# Patient Record
Sex: MALE | Race: WHITE | NOT HISPANIC OR LATINO | Employment: OTHER | URBAN - METROPOLITAN AREA
[De-identification: names, ages, dates, MRNs, and addresses within clinical notes are randomized per-mention and may not be internally consistent; named-entity substitution may affect disease eponyms.]

---

## 2017-10-06 ENCOUNTER — APPOINTMENT (EMERGENCY)
Dept: RADIOLOGY | Facility: HOSPITAL | Age: 82
End: 2017-10-06
Payer: MEDICARE

## 2017-10-06 ENCOUNTER — HOSPITAL ENCOUNTER (EMERGENCY)
Facility: HOSPITAL | Age: 82
Discharge: HOME | End: 2017-10-06
Attending: EMERGENCY MEDICINE | Admitting: EMERGENCY MEDICINE
Payer: MEDICARE

## 2017-10-06 VITALS
SYSTOLIC BLOOD PRESSURE: 199 MMHG | OXYGEN SATURATION: 95 % | DIASTOLIC BLOOD PRESSURE: 93 MMHG | HEART RATE: 74 BPM | RESPIRATION RATE: 16 BRPM | WEIGHT: 230 LBS | TEMPERATURE: 97.8 F

## 2017-10-06 DIAGNOSIS — B86 SCABIES: ICD-10-CM

## 2017-10-06 DIAGNOSIS — R05.9 COUGH: Primary | ICD-10-CM

## 2017-10-06 LAB
ALBUMIN SERPL BCP-MCNC: 2.7 G/DL (ref 3.5–5)
ALP SERPL-CCNC: 66 U/L (ref 46–116)
ALT SERPL W P-5'-P-CCNC: 19 U/L (ref 12–78)
ANION GAP SERPL CALCULATED.3IONS-SCNC: 6 MMOL/L (ref 4–13)
AST SERPL W P-5'-P-CCNC: 26 U/L (ref 5–45)
ATRIAL RATE: 64 BPM
BACTERIA UR QL AUTO: NORMAL /HPF
BASOPHILS # BLD AUTO: 0.03 THOUSANDS/ΜL (ref 0–0.1)
BASOPHILS NFR BLD AUTO: 0 % (ref 0–1)
BILIRUB SERPL-MCNC: 0.57 MG/DL (ref 0.2–1)
BILIRUB UR QL STRIP: ABNORMAL
BUN SERPL-MCNC: 15 MG/DL (ref 5–25)
CALCIUM SERPL-MCNC: 8.2 MG/DL (ref 8.3–10.1)
CHLORIDE SERPL-SCNC: 106 MMOL/L (ref 100–108)
CLARITY UR: CLEAR
CO2 SERPL-SCNC: 27 MMOL/L (ref 21–32)
COLOR UR: ABNORMAL
COLOR, POC: NORMAL
CREAT SERPL-MCNC: 1.13 MG/DL (ref 0.6–1.3)
EOSINOPHIL # BLD AUTO: 0.06 THOUSAND/ΜL (ref 0–0.61)
EOSINOPHIL NFR BLD AUTO: 1 % (ref 0–6)
ERYTHROCYTE [DISTWIDTH] IN BLOOD BY AUTOMATED COUNT: 14.5 % (ref 11.6–15.1)
GFR SERPL CREATININE-BSD FRML MDRD: 57 ML/MIN/1.73SQ M
GLUCOSE SERPL-MCNC: 146 MG/DL (ref 65–140)
GLUCOSE UR STRIP-MCNC: NEGATIVE MG/DL
HCT VFR BLD AUTO: 39.2 % (ref 36.5–49.3)
HGB BLD-MCNC: 13.5 G/DL (ref 12–17)
HGB UR QL STRIP.AUTO: ABNORMAL
HYALINE CASTS #/AREA URNS LPF: NORMAL /LPF
KETONES UR STRIP-MCNC: ABNORMAL MG/DL
LEUKOCYTE ESTERASE UR QL STRIP: NEGATIVE
LYMPHOCYTES # BLD AUTO: 0.93 THOUSANDS/ΜL (ref 0.6–4.47)
LYMPHOCYTES NFR BLD AUTO: 10 % (ref 14–44)
MCH RBC QN AUTO: 30.8 PG (ref 26.8–34.3)
MCHC RBC AUTO-ENTMCNC: 34.4 G/DL (ref 31.4–37.4)
MCV RBC AUTO: 90 FL (ref 82–98)
MONOCYTES # BLD AUTO: 1.16 THOUSAND/ΜL (ref 0.17–1.22)
MONOCYTES NFR BLD AUTO: 12 % (ref 4–12)
NEUTROPHILS # BLD AUTO: 7.33 THOUSANDS/ΜL (ref 1.85–7.62)
NEUTS SEG NFR BLD AUTO: 77 % (ref 43–75)
NITRITE UR QL STRIP: NEGATIVE
NON-SQ EPI CELLS URNS QL MICRO: NORMAL /HPF
NRBC BLD AUTO-RTO: 0 /100 WBCS
P AXIS: 23 DEGREES
PH UR STRIP.AUTO: 6 [PH] (ref 4.5–8)
PLATELET # BLD AUTO: 196 THOUSANDS/UL (ref 149–390)
PMV BLD AUTO: 10.5 FL (ref 8.9–12.7)
POTASSIUM SERPL-SCNC: 3.8 MMOL/L (ref 3.5–5.3)
PR INTERVAL: 182 MS
PROT SERPL-MCNC: 6.9 G/DL (ref 6.4–8.2)
PROT UR STRIP-MCNC: ABNORMAL MG/DL
QRS AXIS: -2 DEGREES
QRSD INTERVAL: 92 MS
QT INTERVAL: 408 MS
QTC INTERVAL: 420 MS
RBC # BLD AUTO: 4.38 MILLION/UL (ref 3.88–5.62)
RBC #/AREA URNS AUTO: NORMAL /HPF
SODIUM SERPL-SCNC: 139 MMOL/L (ref 136–145)
SP GR UR STRIP.AUTO: 1.02 (ref 1–1.03)
SPECIMEN SOURCE: NORMAL
T WAVE AXIS: 29 DEGREES
TROPONIN I BLD-MCNC: 0.01 NG/ML (ref 0–0.08)
UROBILINOGEN UR QL STRIP.AUTO: 1 E.U./DL
VENTRICULAR RATE: 64 BPM
WBC # BLD AUTO: 9.54 THOUSAND/UL (ref 4.31–10.16)
WBC #/AREA URNS AUTO: NORMAL /HPF

## 2017-10-06 PROCEDURE — 93005 ELECTROCARDIOGRAM TRACING: CPT | Performed by: EMERGENCY MEDICINE

## 2017-10-06 PROCEDURE — 81002 URINALYSIS NONAUTO W/O SCOPE: CPT | Performed by: EMERGENCY MEDICINE

## 2017-10-06 PROCEDURE — 85025 COMPLETE CBC W/AUTO DIFF WBC: CPT | Performed by: EMERGENCY MEDICINE

## 2017-10-06 PROCEDURE — 87086 URINE CULTURE/COLONY COUNT: CPT | Performed by: EMERGENCY MEDICINE

## 2017-10-06 PROCEDURE — 71020 HB CHEST X-RAY 2VW FRONTAL&LATL: CPT | Performed by: EMERGENCY MEDICINE

## 2017-10-06 PROCEDURE — 36415 COLL VENOUS BLD VENIPUNCTURE: CPT | Performed by: EMERGENCY MEDICINE

## 2017-10-06 PROCEDURE — 80053 COMPREHEN METABOLIC PANEL: CPT | Performed by: EMERGENCY MEDICINE

## 2017-10-06 PROCEDURE — 81001 URINALYSIS AUTO W/SCOPE: CPT

## 2017-10-06 PROCEDURE — 84484 ASSAY OF TROPONIN QUANT: CPT

## 2017-10-06 PROCEDURE — 99285 EMERGENCY DEPT VISIT HI MDM: CPT

## 2017-10-06 RX ORDER — METOPROLOL SUCCINATE 50 MG/1
25 TABLET, EXTENDED RELEASE ORAL DAILY
COMMUNITY
End: 2018-07-02 | Stop reason: SDUPTHER

## 2017-10-06 RX ORDER — CEFPODOXIME PROXETIL 200 MG/1
200 TABLET, FILM COATED ORAL 2 TIMES DAILY
COMMUNITY
End: 2018-06-28

## 2017-10-06 RX ORDER — LEVOTHYROXINE SODIUM 0.1 MG/1
137 TABLET ORAL DAILY
COMMUNITY
End: 2018-06-28 | Stop reason: DRUGHIGH

## 2017-10-06 RX ORDER — GUAIFENESIN 100 MG/5ML
200 SYRUP ORAL 4 TIMES DAILY PRN
COMMUNITY
End: 2018-06-28

## 2017-10-06 RX ORDER — ASPIRIN 81 MG/1
81 TABLET ORAL DAILY
COMMUNITY
End: 2018-07-11

## 2017-10-06 RX ORDER — AZITHROMYCIN 250 MG/1
TABLET, FILM COATED ORAL
Qty: 6 TABLET | Refills: 0 | Status: SHIPPED | OUTPATIENT
Start: 2017-10-06 | End: 2018-06-28

## 2017-10-06 RX ORDER — PERMETHRIN 50 MG/G
CREAM TOPICAL
Qty: 60 G | Refills: 0 | Status: SHIPPED | OUTPATIENT
Start: 2017-10-06 | End: 2018-09-18 | Stop reason: ALTCHOICE

## 2017-10-06 RX ORDER — LORAZEPAM 0.5 MG/1
0.25 TABLET ORAL EVERY 4 HOURS PRN
COMMUNITY

## 2017-10-06 RX ORDER — ACETAMINOPHEN 500 MG
500 TABLET ORAL 3 TIMES DAILY
COMMUNITY
End: 2018-02-21

## 2017-10-06 RX ORDER — TRAZODONE HYDROCHLORIDE 50 MG/1
50 TABLET ORAL DAILY
COMMUNITY
End: 2018-06-28

## 2017-10-06 RX ORDER — QUETIAPINE FUMARATE 25 MG/1
12.5 TABLET, FILM COATED ORAL 2 TIMES DAILY
COMMUNITY
End: 2018-07-10 | Stop reason: SDUPTHER

## 2017-10-06 RX ORDER — MEMANTINE HYDROCHLORIDE 10 MG/1
5 TABLET ORAL 2 TIMES DAILY
COMMUNITY
End: 2018-06-28

## 2017-10-06 NOTE — ED NOTES
[facility] will be arriving for patient at 1530 to transport patient BLS stretcher [facility]  [facility] contacted to let RN know of patient's discharge status        Elane Primrose, RN  10/06/17 7426

## 2017-10-06 NOTE — ED ATTENDING ATTESTATION
Toni Martinez MD, saw and evaluated the patient  I have discussed the patient with the resident/non-physician practitioner and agree with the resident's/non-physician practitioner's findings, Plan of Care, and MDM as documented in the resident's/non-physician practitioner's note, except where noted  All available labs and Radiology studies were reviewed  At this point I agree with the current assessment done in the Emergency Department  I have conducted an independent evaluation of this patient a history and physical is as follows:      Critical Care Time  CritCare Time    79 yo male with dementia with no complaints, sent in from nursing home for decline over the last one week  Pt with no cp, does have cough on exam   Pt with hx of htn, hld, afib  Pt with rash itchy noted generally  Unable to get in touch with nursing home  Vss, afebrile, lungs cta, rrr, abdomen soft nontender, no focal deficits  Pt daughter states pt at baseline  Labs, ekg, cxr, urine

## 2017-10-06 NOTE — ED PROVIDER NOTES
History  Chief Complaint   Patient presents with    Altered Mental Status     per nursing home, slow decline over one week  pt offers no complaints  HPI   This is a 80-year-old male with history of dementia, hypertension, and AFib presenting to the ER for evaluation of altered mental status  Patient is oriented only to himself, unable to provide much history  Patient has no complaints currently  He denies headache, chest pain, shortness of breath, abdominal pain, nausea, extremity pain  Of note, patient is seen in the room coughing  Patient also has multiple erythematous lesions on the back in her extremities, consistent with bedbugs or scabies  The patient states that he does have some achiness around his back and his arms  Patient says he has no pain at this area, states he thinks that the rash has been there for about a month  I attempted to call [facility], there was no answer  Prior to Admission Medications   Prescriptions Last Dose Informant Patient Reported? Taking?    LORazepam (ATIVAN) 0 5 mg tablet   Yes Yes   Sig: Take 0 25 mg by mouth every 4 (four) hours as needed for anxiety   QUEtiapine (SEROquel) 25 mg tablet   Yes Yes   Sig: Take 12 5 mg by mouth 2 (two) times a day   acetaminophen (TYLENOL) 500 mg tablet   Yes Yes   Sig: Take 500 mg by mouth 3 (three) times a day For 5 days, end 10/9   aspirin (ECOTRIN LOW STRENGTH) 81 mg EC tablet   Yes Yes   Sig: Take 81 mg by mouth daily   cefpodoxime (VANTIN) 200 mg tablet   Yes Yes   Sig: Take 200 mg by mouth 2 (two) times a day For 5 days, end 10/11   guaiFENesin (ROBITUSSIN) 100 mg/5 mL syrup   Yes Yes   Sig: Take 200 mg by mouth 4 (four) times a day as needed for cough X 5 days, end 10/9   levothyroxine 100 mcg tablet   Yes Yes   Sig: Take 137 mcg by mouth daily   memantine (NAMENDA) 10 mg tablet   Yes Yes   Sig: Take 10 mg by mouth 2 (two) times a day   metoprolol succinate (TOPROL-XL) 50 mg 24 hr tablet   Yes Yes   Sig: Take 25 mg by mouth daily   traZODone (DESYREL) 50 mg tablet   Yes Yes   Sig: Take 50 mg by mouth daily      Facility-Administered Medications: None       Past Medical History:   Diagnosis Date    Alzheimer disease     Atrial fibrillation (HCC)     Hyperlipidemia     Hypertension        History reviewed  No pertinent surgical history  History reviewed  No pertinent family history  I have reviewed and agree with the history as documented  Social History   Substance Use Topics    Smoking status: Never Smoker    Smokeless tobacco: Never Used    Alcohol use No        Review of Systems   Limited due to dementia  Constitutional: Negative for appetite change, chills and fever  HENT: Negative for congestion, rhinorrhea and sore throat  Eyes: Negative for photophobia, pain and visual disturbance  Respiratory: Negative for cough, chest tightness and shortness of breath  Cardiovascular: Negative for chest pain, palpitations and leg swelling  Gastrointestinal: Negative for abdominal pain, diarrhea, nausea and vomiting  Genitourinary: Negative for dysuria, flank pain and hematuria  Musculoskeletal: Negative for back pain, neck pain and neck stiffness  Skin: Negative for color change, wound  positive for rash  Neurological: Negative for dizziness, numbness and headaches  All other systems reviewed and are negative        Physical Exam  ED Triage Vitals   Temperature Pulse Respirations Blood Pressure SpO2   10/06/17 1110 10/06/17 1110 10/06/17 1110 10/06/17 1110 10/06/17 1110   97 8 °F (36 6 °C) 64 16 139/66 95 %      Temp Source Heart Rate Source Patient Position - Orthostatic VS BP Location FiO2 (%)   10/06/17 1110 10/06/17 1300 10/06/17 1300 10/06/17 1300 --   Oral Monitor Sitting Right arm       Pain Score       10/06/17 1110       No Pain           Physical Exam  BP (!) 199/93   Pulse 74   Temp 97 8 °F (36 6 °C) (Oral)   Resp 16   Wt 104 kg (230 lb)   SpO2 95%     General Appearance:  Alert, cooperative, no distress, appears stated age   Head:  Normocephalic, without obvious abnormality, atraumatic   Eyes:  PERRL, conjunctiva/corneas clear, EOM's intact       Nose: Nares normal, septum midline, mucosa normal, no drainage or sinus tenderness   Throat: Lips, mucosa, and tongue normal; teeth and gums normal   Neck: Supple, symmetrical, trachea midline, no adenopathy   Back:   Symmetric, no curvature, ROM normal, no CVA tenderness   Lungs:   Respirations unlabored, right base with some mild crackles, no wheezing and either fields, clear in the left lung  Chest Wall:  No tenderness or deformity   Heart:  Regular rate and rhythm, S1, S2 normal, no murmur, rub or gallop   Abdomen:   Soft, non-tender, bowel sounds active all four quadrants           Extremities: Extremities normal, atraumatic, no cyanosis or edema   Pulses: 2+ and symmetric   Skin: Patient's upper back bilaterally with erythematous rash, scattered scabs, no vesicles, blanchable  This was also seen on bilateral upper extremities       Neurologic:      Psychiatric:  Moves all extremities, sensation and strength in tact in all extremities    Normal mood and affect         ED Medications  Medications - No data to display    Diagnostic Studies  Labs Reviewed   COMPREHENSIVE METABOLIC PANEL - Abnormal        Result Value Ref Range Status    Glucose 146 (*) 65 - 140 mg/dL Final    Comment:   If the patient is fasting, the ADA then defines impaired fasting glucose as > 100 mg/dL and diabetes as > or equal to 123 mg/dL  Specimen collection should occur prior to Sulfasalazine administration due to the potential for falsely depressed results  Specimen collection should occur prior to Sulfapyridine administration due to the potential for falsely elevated results      Calcium 8 2 (*) 8 3 - 10 1 mg/dL Final    Albumin 2 7 (*) 3 5 - 5 0 g/dL Final    Sodium 139  136 - 145 mmol/L Final    Potassium 3 8  3 5 - 5 3 mmol/L Final    Comment: Slightly Hemolyzed; Results May be Affected    Chloride 106  100 - 108 mmol/L Final    CO2 27  21 - 32 mmol/L Final    Anion Gap 6  4 - 13 mmol/L Final    BUN 15  5 - 25 mg/dL Final    Creatinine 1 13  0 60 - 1 30 mg/dL Final    Comment: Standardized to IDMS reference method    AST 26  5 - 45 U/L Final    Comment: Slightly Hemolyzed; Results May be Affected  Specimen collection should occur prior to Sulfasalazine administration due to the potential for falsely depressed results  ALT 19  12 - 78 U/L Final    Comment:   Specimen collection should occur prior to Sulfasalazine and/or Sulfapyridine administration due to the potential for falsely depressed results  Alkaline Phosphatase 66  46 - 116 U/L Final    Total Protein 6 9  6 4 - 8 2 g/dL Final    Total Bilirubin 0 57  0 20 - 1 00 mg/dL Final    eGFR 57  ml/min/1 73sq m Final    Narrative:     National Kidney Disease Education Program recommendations are as follows:  GFR calculation is accurate only with a steady state creatinine  Chronic Kidney disease less than 60 ml/min/1 73 sq  meters  Kidney failure less than 15 ml/min/1 73 sq  meters     CBC AND DIFFERENTIAL - Abnormal     Neutrophils Relative 77 (*) 43 - 75 % Final    Lymphocytes Relative 10 (*) 14 - 44 % Final    WBC 9 54  4 31 - 10 16 Thousand/uL Final    RBC 4 38  3 88 - 5 62 Million/uL Final    Hemoglobin 13 5  12 0 - 17 0 g/dL Final    Hematocrit 39 2  36 5 - 49 3 % Final    MCV 90  82 - 98 fL Final    MCH 30 8  26 8 - 34 3 pg Final    MCHC 34 4  31 4 - 37 4 g/dL Final    RDW 14 5  11 6 - 15 1 % Final    MPV 10 5  8 9 - 12 7 fL Final    Platelets 100  665 - 390 Thousands/uL Final    nRBC 0  /100 WBCs Final    Monocytes Relative 12  4 - 12 % Final    Eosinophils Relative 1  0 - 6 % Final    Basophils Relative 0  0 - 1 % Final    Neutrophils Absolute 7 33  1 85 - 7 62 Thousands/µL Final    Lymphocytes Absolute 0 93  0 60 - 4 47 Thousands/µL Final    Monocytes Absolute 1 16  0 17 - 1 22 Thousand/µL Final    Eosinophils Absolute 0 06  0 00 - 0 61 Thousand/µL Final    Basophils Absolute 0 03  0 00 - 0 10 Thousands/µL Final   ED URINE MACROSCOPIC - Abnormal     Protein,  (2+) (*) Negative mg/dl Final    Ketones, UA 15 (1+) (*) Negative mg/dl Final    Bilirubin, UA Interference- unable to analyze (*) Negative Final    Comment: The dipstick result may be falsely positive do to interfering substances  We recommend reliance upon serum bilirubin, liver & kidney function tests to guide patient care if clinically indicated  Blood, UA Trace (*) Negative Final    Color, UA Halie   Final    Clarity, UA Clear   Final    pH, UA 6 0  4 5 - 8 0 Final    Leukocytes, UA Negative  Negative Final    Nitrite, UA Negative  Negative Final    Glucose, UA Negative  Negative mg/dl Final    Urobilinogen, UA 1 0  0 2, 1 0 E U /dl E U /dl Final    Specific Gravity, UA 1 025  1 003 - 1 030 Final    Narrative:     CLINITEK RESULT   URINE MICROSCOPIC - Normal    RBC, UA None Seen  None Seen, 0-5 /hpf Final    WBC, UA None Seen  None Seen, 0-5, 5-55, 5-65 /hpf Final    Epithelial Cells None Seen  None Seen, Occasional /hpf Final    Bacteria, UA None Seen  None Seen, Occasional /hpf Final    Hyaline Casts, UA None Seen  None Seen /lpf Final   POCT URINALYSIS DIPSTICK - Normal    Color, UA see chart results   Final   POCT TROPONIN - Normal    POC Troponin I 0 01  0 00 - 0 08 ng/ml Final    Specimen Type VENOUS   Final    Narrative:     [employer] i-Stat handheld analyzer 99% cutoff is > 0 08ng/mL in [facility] Emergency Departments    o cTnI 99% cutoff is useful only when applied to patients in the clinical setting of myocardial ischemia  o cTnI 99% cutoff should be interpreted in the context of clinical history, ECG findings and possibly cardiac imaging to establish correct diagnosis  o cTnI 99% cutoff may be suggestive but clearly not indicative of a coronary event without the clinical setting of myocardial ischemia     URINE CULTURE       X-ray chest 2 views Final Result      Borderline prominence of cardiac silhouette size  Clear lungs  Workstation performed: [id number]             Procedures  ECG 12 Lead Documentation  Date/Time: 10/6/2017 1:21 PM  Performed by: Sri Kerns by: Katie Shetty     Indications / Diagnosis:  Weakness  ECG reviewed by me, the ED Provider: yes    Patient location:  ED  Previous ECG:     Previous ECG:  Compared to current    Similarity:  No change  Interpretation:     Interpretation: normal    Rate:     ECG rate:  64    ECG rate assessment: normal    Rhythm:     Rhythm: sinus rhythm    Ectopy:     Ectopy: none    QRS:     QRS axis:  Normal    QRS intervals:  Normal  Conduction:     Conduction: normal    ST segments:     ST segments:  Normal  T waves:     T waves: normal            Phone Consults  ED Phone Contact    ED Course  ED Course as of Oct 06 1704   Fri Oct 06, 2017   1317 I spoke with nursing supervisor from [facility]  She was unaware of the patient's rash, and did not know much else about his history except that he was having some weakness and some diaphoresis for the past 3 days  She states that there are 2 other residents at that nursing facility with scabies  MDM   After speaking with the supervising nurse, patient may potentially have pneumonia causing his weakness and diaphoresis  We will get a cardiac workup, check a chest x-ray, get a urine analysis  Will also treat patient for scabies  CritCare Time    Disposition  Final diagnoses:   Cough   Scabies     ED Disposition     ED Disposition Condition Comment    Discharge  Guru Chen  discharge to home/self care      Condition at discharge: Stable        Follow-up Information     Follow up With Specialties Details Why Bettie Wu MD Family Medicine Schedule an appointment as soon as possible for a visit in 3 days  [address] [facility]  [phone]          Discharge

## 2017-10-06 NOTE — DISCHARGE INSTRUCTIONS
Acute Cough   WHAT YOU NEED TO KNOW:   What is an acute cough? An acute cough can last up to 3 weeks  Common causes of an acute cough include a cold, allergies, or a lung infection  How is the cause of an acute cough diagnosed? Your healthcare provider will examine you and listen to your lungs  Tell your healthcare provider if you cough up any mucus, or have a fever or shortness of breath  Also tell your provider what makes the cough better or worse  Depending on your symptoms, you may need a chest x-ray  A sample of mucus may be collected and tested for infection  How is an acute cough treated? An acute cough usually goes away on its own  Ask your healthcare provider about medicines you can take to decrease your cough  You may need medicine to stop the cough, decrease swelling in your airways, or help open your airways  Medicine may also be given to help you cough up mucus  If you have an infection caused by bacteria, you may need antibiotics  What can I do to manage my cough? · Do not smoke and stay away from others who smoke  Nicotine and other chemicals in cigarettes and cigars can cause lung damage and make your cough worse  Ask your healthcare provider for information if you currently smoke and need help to quit  E-cigarettes or smokeless tobacco still contain nicotine  Talk to your healthcare provider before you use these products  · Drink extra liquids as directed  Liquids will help thin and loosen mucus so you can cough it up  Liquids will also help prevent dehydration  Examples of good liquids to drink include water, fruit juice, and broth  Do not drink liquids that contain caffeine  Caffeine can increase your risk for dehydration  Ask your healthcare provider how much liquid to drink each day  · Rest as directed  Do not do activities that make your cough worse, such as exercise  · Use a humidifier or vaporizer    Use a cool mist humidifier or a vaporizer to increase air moisture in your home  This may make it easier for you to breathe and help decrease your cough  · Eat 2 to 5 mL of honey 2 times each day  Honey can help thin mucus and decrease your cough  · Use cough drops or lozenges  These can help decrease throat irritation and your cough  When should I seek immediate care? · You have trouble breathing or feel short of breath  · You cough up blood, or you see blood in your mucus  · You faint or feel weak or dizzy  · You have chest pain when you cough or take a deep breath  · You have new wheezing  When should I contact my healthcare provider? · You have a fever  · Your cough lasts longer than 4 weeks  · Your symptoms do not improve with treatment  · You have questions or concerns about your condition or care  CARE AGREEMENT:   You have the right to help plan your care  Learn about your health condition and how it may be treated  Discuss treatment options with your caregivers to decide what care you want to receive  You always have the right to refuse treatment  The above information is an  only  It is not intended as medical advice for individual conditions or treatments  Talk to your doctor, nurse or pharmacist before following any medical regimen to see if it is safe and effective for you  © 2017 [employer]  Information is for End User's use only and may not be sold, redistributed or otherwise used for commercial purposes  All illustrations and images included in CareNotes® are the copyrighted property of [employer]  or [employer]  Scabies   WHAT YOU NEED TO KNOW:   What is scabies? Scabies is a skin condition that is caused by scabies mites  Scabies mites are tiny bugs that burrow, lay eggs, and live underneath the skin  Scabies is spread through close contact with a person who has scabies  This includes having sex, sleeping in the same bed, or sharing towels or clothing  Scabies can spread quickly and must be treated as soon as it is found  What are the signs and symptoms of scabies? You may not know you have scabies until a few weeks after mites are under your skin  Scabies mites are too small to be seen on your body  You may have any of the following:  · Red, raised bumps on your skin    · Bad itching that is usually worse at night    · Burrow marks (short wavy lines) between your fingers, or on your ankles, elbows, groin, armpits, or breasts  How is scabies diagnosed and treated? Your healthcare provider will examine your skin  The provider will put mineral oil on your skin and scrape it across with a small blade  The skin scraping will be checked under a microscope for eggs, mites, or their droppings  Your healthcare provider may want to treat scabies even if signs of mites are not found  Several kinds of medicine may be used to treat scabies  The medicine may be a cream or pill  Always follow the directions for the scabies medicine you are told to use  · Your healthcare provider may tell you to rub a thin layer of the medicine onto your entire body from the neck down  · Leave the cream on for the amount of time that is required for the medicine you are using  This may be between 8 to 14 hours  · Take a bath or shower to wash all medicine from your skin after the scabies treatment is done  · Put on clean clothes after you have rinsed the medicine off  You may need another scabies treatment in about 7 to 10 days if you continue to have symptoms  What can I do about the itching? Your skin may continue to itch for 2 or 3 weeks, even after the scabies mites are gone  Over-the-counter antihistamines or cortisone cream may help relieve itching  Trim your fingernails so you do not spread any mites that are still alive after treatment  Do not scratch your skin  Scratches may cause a skin infection  A cool bath may also help relieve the itching    How do I prevent the spread of scabies? · Have all family members use scabies medicine  Tell all sex partners and anyone who has shared your clothing or bed for the past month about the scabies  Tell them to ask their healthcare provider for scabies medicine even if they have no itching, rash, or burrow marks  · Wash all items that you have used since 3 days before you learned about your scabies  Use hot water to wash all clothing, bedding, and towels  Dry them for at least 20 minutes on the hot cycle of a dryer  Take items to be dry cleaned that cannot be washed in a washing machine  Place any clothing or bedding that cannot be washed or dry cleaned in a closed plastic bag for 1 week  · Do not have close body contact with anyone until the scabies mites are gone  Talk to your healthcare provider about how long you need to wait  Also ask about public places to avoid, such as the gym  When should I seek immediate care? · You develop a fever and red, swollen, painful areas on your skin  When should I contact my healthcare provider? · The bites become crusty or filled with pus  · You have worsening itching after scabies treatment  · You have new bite or burrow marks after treatment  · You have questions or concerns about your condition or care  CARE AGREEMENT:   You have the right to help plan your care  Learn about your health condition and how it may be treated  Discuss treatment options with your caregivers to decide what care you want to receive  You always have the right to refuse treatment  The above information is an  only  It is not intended as medical advice for individual conditions or treatments  Talk to your doctor, nurse or pharmacist before following any medical regimen to see if it is safe and effective for you  © 2017 [employer] Information is for End User's use only and may not be sold, redistributed or otherwise used for commercial purposes   All illustrations and images included in CareNotes® are the copyrighted property of A D A [employer] , Inc  or [facility] 17

## 2017-10-07 LAB — BACTERIA UR CULT: NORMAL

## 2018-02-13 ENCOUNTER — HOSPITAL ENCOUNTER (EMERGENCY)
Facility: HOSPITAL | Age: 83
Discharge: NURSING, REHAB OR LONG-TERM CARE | End: 2018-02-14
Attending: EMERGENCY MEDICINE
Payer: MEDICARE

## 2018-02-13 DIAGNOSIS — R05.9 COUGH: Primary | ICD-10-CM

## 2018-02-13 PROCEDURE — 93005 ELECTROCARDIOGRAM TRACING: CPT

## 2018-02-14 VITALS
DIASTOLIC BLOOD PRESSURE: 68 MMHG | SYSTOLIC BLOOD PRESSURE: 151 MMHG | TEMPERATURE: 97.6 F | WEIGHT: 190 LBS | RESPIRATION RATE: 24 BRPM | BODY MASS INDEX: 26.6 KG/M2 | HEART RATE: 60 BPM | OXYGEN SATURATION: 99 % | HEIGHT: 71 IN

## 2018-02-14 LAB
ATRIAL RATE: 69 BPM
P AXIS: 37 DEGREES
PR INTERVAL: 184 MS
QRS AXIS: 8 DEGREES
QRSD INTERVAL: 88 MS
QT INTERVAL: 392 MS
QTC INTERVAL: 420 MS
T WAVE AXIS: 17 DEGREES
VENTRICULAR RATE: 69 BPM

## 2018-02-14 PROCEDURE — 93010 ELECTROCARDIOGRAM REPORT: CPT | Performed by: INTERNAL MEDICINE

## 2018-02-14 PROCEDURE — 99285 EMERGENCY DEPT VISIT HI MDM: CPT

## 2018-02-14 NOTE — ED PROVIDER NOTES
History  Chief Complaint   Patient presents with    Shortness of Breath     Pt reports SOB  Pt was sent in by [facility] Dr Carlos Cardenas for pneumonia and atelectasis dx earlier today  Pt came in with a POLST that danay polo pt's ED chart  80year-old man with a history of dementia, afib, hypertension, and hyperlipidemia presents for evaluation of a cough  Patient is a resident at [facility] who was evaluated for a cough today with chest xray which showed mild subsegmental atelectasis with possible bronchopneumonia RLL with worsening opacity LLL  He did not have increased work of breathing or hypoxia  He was sent in for evaluation of this chest xray finding  Patient has a POLST form which states he should not receive antibiotics or IV fluids  He is unable to provide history or make his own decisions secondary to underlying dementia  On arrival, patient is afebrile and hypertensive to 173/84 with otherwise unremarkable vital signs  Physical exam shows decreased aeration in bilateral bases and is otherwise unremarkable, as below  No indication for admission, further workup, or treatment at this time  Will discharge patient back to [facility]  Prior to Admission Medications   Prescriptions Last Dose Informant Patient Reported? Taking?    LORazepam (ATIVAN) 0 5 mg tablet   Yes No   Sig: Take 0 25 mg by mouth every 4 (four) hours as needed for anxiety   QUEtiapine (SEROquel) 25 mg tablet   Yes No   Sig: Take 12 5 mg by mouth 2 (two) times a day   acetaminophen (TYLENOL) 500 mg tablet   Yes No   Sig: Take 500 mg by mouth 3 (three) times a day For 5 days, end 10/9   aspirin (ECOTRIN LOW STRENGTH) 81 mg EC tablet   Yes No   Sig: Take 81 mg by mouth daily   azithromycin (ZITHROMAX) 250 mg tablet   No No   Sig: Take 2 tablets today then 1 tablet daily x 4 days   cefpodoxime (VANTIN) 200 mg tablet   Yes No   Sig: Take 200 mg by mouth 2 (two) times a day For 5 days, end 10/11 guaiFENesin (ROBITUSSIN) 100 mg/5 mL syrup   Yes No   Sig: Take 200 mg by mouth 4 (four) times a day as needed for cough X 5 days, end 10/9   levothyroxine 100 mcg tablet   Yes No   Sig: Take 137 mcg by mouth daily   memantine (NAMENDA) 10 mg tablet   Yes No   Sig: Take 10 mg by mouth 2 (two) times a day   metoprolol succinate (TOPROL-XL) 50 mg 24 hr tablet   Yes No   Sig: Take 25 mg by mouth daily   permethrin (ELIMITE) 5 % cream   No No   Sig: Apply to affected area once   traZODone (DESYREL) 50 mg tablet   Yes No   Sig: Take 50 mg by mouth daily      Facility-Administered Medications: None       Past Medical History:   Diagnosis Date    Alzheimer disease     Atrial fibrillation ([facility] [address] )     Hyperlipidemia     Hypertension        History reviewed  No pertinent surgical history  History reviewed  No pertinent family history  I have reviewed and agree with the history as documented  Social History   Substance Use Topics    Smoking status: Never Smoker    Smokeless tobacco: Never Used    Alcohol use No        Review of Systems   Unable to perform ROS: Dementia       Physical Exam  ED Triage Vitals   Temperature Pulse Respirations Blood Pressure SpO2   02/13/18 2241 02/13/18 2241 02/13/18 2241 02/13/18 2243 02/13/18 2241   97 6 °F (36 4 °C) 68 22 (!) 173/84 97 %      Temp Source Heart Rate Source Patient Position - Orthostatic VS BP Location FiO2 (%)   02/13/18 2241 -- -- -- --   Oral          Pain Score       02/13/18 2240       No Pain           Orthostatic Vital Signs  Vitals:    02/13/18 2315 02/14/18 0045 02/14/18 0115 02/14/18 0315   BP: 169/73 149/64 (!) 175/70 151/68   Pulse: 60 58 60 60       Physical Exam   Constitutional: He appears well-developed and well-nourished  No distress  HENT:   Head: Normocephalic and atraumatic  Eyes: Conjunctivae are normal  Pupils are equal, round, and reactive to light  No scleral icterus  Neck: Neck supple  No JVD present     Cardiovascular: Normal rate, regular rhythm and normal heart sounds  Exam reveals no gallop and no friction rub  No murmur heard  Pulmonary/Chest: Effort normal  No respiratory distress  He has no wheezes  He has no rales  Decreased aeration bilateral bases, dry cough    Abdominal: Soft  He exhibits no distension  There is no tenderness  There is no rebound and no guarding  Musculoskeletal: He exhibits no edema or tenderness  Neurological: He is alert  Oriented to person only   Skin: Skin is warm and dry  He is not diaphoretic  Psychiatric: He has a normal mood and affect  His behavior is normal  Thought content normal    Vitals reviewed  ED Medications  Medications - No data to display    Diagnostic Studies  Results Reviewed     None                 No orders to display         Procedures  Procedures      Phone Consults  ED Phone Contact    ED Course  ED Course            Identification of Seniors at [facility] Most Recent Value   (ISAR) Identification of Seniors at Risk   Before the illness or injury that brought you to the Emergency, did you need someone to help you on a regular basis? 0 Filed at: 02/13/2018 2243   In the last 24 hours, have you needed more help than usual?  0 Filed at: 02/13/2018 2243   Have you been hospitalized for one or more nights during the past 6 months? 0 Filed at: 02/13/2018 2243   In general, do you see well?  0 Filed at: 02/13/2018 2243   In general, do you have serious problems with your memory? 1 Filed at: 02/13/2018 2243   Do you take more than three different medications every day? 1 Filed at: 02/13/2018 2243   ISAR Score  2 Filed at: 02/13/2018 2243                          MDM  Number of Diagnoses or Management Options  Cough:   Diagnosis management comments: No respiratory distress or hypoxia  Patient does not want antibiotics or IV fluids per POLST  He was discharged back to [facility]      CritCare Time    Disposition  Final diagnoses:   Cough     Time reflects when diagnosis was documented in both MDM as applicable and the Disposition within this note     Time User Action Codes Description Comment    2/13/2018 11:44 PM Kristen Fung Add [R05] Cough       ED Disposition     ED Disposition Condition Comment    Discharge  Darrick Garcia  discharge to home/self care      Condition at discharge: Good        Follow-up Information     Follow up With Specialties Details Why [address], [place] Family Medicine Schedule an appointment as soon as possible for a visit  Kavita Garcia  [phone]          Discharge Medication List as of 2/13/2018 11:45 PM      CONTINUE these medications which have NOT CHANGED    Details   acetaminophen (TYLENOL) 500 mg tablet Take 500 mg by mouth 3 (three) times a day For 5 days, end 10/9, Historical Med      aspirin (ECOTRIN LOW STRENGTH) 81 mg EC tablet Take 81 mg by mouth daily, Historical Med      azithromycin (ZITHROMAX) 250 mg tablet Take 2 tablets today then 1 tablet daily x 4 days, Print      cefpodoxime (VANTIN) 200 mg tablet Take 200 mg by mouth 2 (two) times a day For 5 days, end 10/11, Historical Med      guaiFENesin (ROBITUSSIN) 100 mg/5 mL syrup Take 200 mg by mouth 4 (four) times a day as needed for cough X 5 days, end 10/9, Historical Med      levothyroxine 100 mcg tablet Take 137 mcg by mouth daily, Historical Med      LORazepam (ATIVAN) 0 5 mg tablet Take 0 25 mg by mouth every 4 (four) hours as needed for anxiety, Historical Med      memantine (NAMENDA) 10 mg tablet Take 10 mg by mouth 2 (two) times a day, Historical Med      metoprolol succinate (TOPROL-XL) 50 mg 24 hr tablet Take 25 mg by mouth daily, Historical Med      permethrin (ELIMITE) 5 % cream Apply to affected area once, Print      QUEtiapine (SEROquel) 25 mg tablet Take 12 5 mg by mouth 2 (two) times a day, Historical Med      traZODone (DESYREL) 50 mg tablet Take 50 mg by mouth daily, Historical Med No discharge procedures on file  ED Provider  Attending physically available and evaluated Amos Melvin I managed the patient along with the ED Attending      Electronically Signed by         Cece Coleman MD  02/14/18 9972

## 2018-02-14 NOTE — ED ATTENDING ATTESTATION
Yaz Saunders DO, saw and evaluated the patient  I have discussed the patient with the resident/non-physician practitioner and agree with the resident's/non-physician practitioner's findings, Plan of Care, and MDM as documented in the resident's/non-physician practitioner's note, except where noted  All available labs and Radiology studies were reviewed  At this point I agree with the current assessment done in the Emergency Department  I have conducted an independent evaluation of this patient a history and physical is as follows:    80-year-old male presents for evaluation of possible pneumonia noted on x-ray done at nursing home facility today  Patient offers no complaints at this time denies cough, shortness of breath, fevers, chest pain  Patient presents with a POLST which indicates that he would not want antibiotics or IV fluids  There is nothing that we have to offer the patient this time we will discharge him back to the facility          Critical Care Time  CritCare Time    Procedures

## 2018-02-14 NOTE — DISCHARGE INSTRUCTIONS
Your POLST states that you would not want antibiotics or IV fluids  You had a normal work of breathing and oxygen saturation  There is nothing for us to offer you here in the ER  Follow up with your primary doctor and return to the ER for new or worrisome symptoms  Acute Cough   WHAT YOU NEED TO KNOW:   An acute cough can last up to 3 weeks  Common causes of an acute cough include a cold, allergies, or a lung infection  DISCHARGE INSTRUCTIONS:   Return to the emergency department if:   · You have trouble breathing or feel short of breath  · You cough up blood, or you see blood in your mucus  · You faint or feel weak or dizzy  · You have chest pain when you cough or take a deep breath  · You have new wheezing  Contact your healthcare provider if:   · You have a fever  · Your cough lasts longer than 4 weeks  · Your symptoms do not improve with treatment  · You have questions or concerns about your condition or care  Medicines:   · Medicines  may be needed to stop the cough, decrease swelling in your airways, or help open your airways  Medicine may also be given to help you cough up mucus  Ask your healthcare provider what over-the-counter medicines you can take  If you have an infection caused by bacteria, you may need antibiotics  · Take your medicine as directed  Contact your healthcare provider if you think your medicine is not helping or if you have side effects  Tell him or her if you are allergic to any medicine  Keep a list of the medicines, vitamins, and herbs you take  Include the amounts, and when and why you take them  Bring the list or the pill bottles to follow-up visits  Carry your medicine list with you in case of an emergency  Manage your symptoms:   · Do not smoke and stay away from others who smoke  Nicotine and other chemicals in cigarettes and cigars can cause lung damage and make your cough worse   Ask your healthcare provider for information if you currently smoke and need help to quit  E-cigarettes or smokeless tobacco still contain nicotine  Talk to your healthcare provider before you use these products  · Drink extra liquids as directed  Liquids will help thin and loosen mucus so you can cough it up  Liquids will also help prevent dehydration  Examples of good liquids to drink include water, fruit juice, and broth  Do not drink liquids that contain caffeine  Caffeine can increase your risk for dehydration  Ask your healthcare provider how much liquid to drink each day  · Rest as directed  Do not do activities that make your cough worse, such as exercise  · Use a humidifier or vaporizer  Use a cool mist humidifier or a vaporizer to increase air moisture in your home  This may make it easier for you to breathe and help decrease your cough  · Eat 2 to 5 mL of honey 2 times each day  Honey can help thin mucus and decrease your cough  · Use cough drops or lozenges  These can help decrease throat irritation and your cough  Follow up with your healthcare provider as directed:  Write down your questions so you remember to ask them during your visits  © 2017 [facility] Information is for End User's use only and may not be sold, redistributed or otherwise used for commercial purposes  All illustrations and images included in CareNotes® are the copyrighted property of [employer]  or [address]  The above information is an  only  It is not intended as medical advice for individual conditions or treatments  Talk to your doctor, nurse or pharmacist before following any medical regimen to see if it is safe and effective for you

## 2018-02-21 ENCOUNTER — APPOINTMENT (EMERGENCY)
Dept: RADIOLOGY | Facility: HOSPITAL | Age: 83
End: 2018-02-21
Payer: MEDICARE

## 2018-02-21 ENCOUNTER — HOSPITAL ENCOUNTER (EMERGENCY)
Facility: HOSPITAL | Age: 83
Discharge: HOME | End: 2018-02-21
Attending: EMERGENCY MEDICINE | Admitting: EMERGENCY MEDICINE
Payer: MEDICARE

## 2018-02-21 VITALS
TEMPERATURE: 98.2 F | OXYGEN SATURATION: 94 % | RESPIRATION RATE: 19 BRPM | BODY MASS INDEX: 25.8 KG/M2 | WEIGHT: 185 LBS | HEART RATE: 54 BPM | DIASTOLIC BLOOD PRESSURE: 80 MMHG | SYSTOLIC BLOOD PRESSURE: 169 MMHG

## 2018-02-21 DIAGNOSIS — T14.8XXA ABRASION: ICD-10-CM

## 2018-02-21 DIAGNOSIS — S09.90XA CLOSED HEAD INJURY, INITIAL ENCOUNTER: Primary | ICD-10-CM

## 2018-02-21 LAB
ATRIAL RATE: 57 BPM
P AXIS: 27 DEGREES
PR INTERVAL: 188 MS
QRS AXIS: 11 DEGREES
QRSD INTERVAL: 100 MS
QT INTERVAL: 428 MS
QTC INTERVAL: 416 MS
T WAVE AXIS: 61 DEGREES
VENTRICULAR RATE: 57 BPM

## 2018-02-21 PROCEDURE — 70450 CT HEAD/BRAIN W/O DYE: CPT

## 2018-02-21 PROCEDURE — 72125 CT NECK SPINE W/O DYE: CPT

## 2018-02-21 PROCEDURE — 93005 ELECTROCARDIOGRAM TRACING: CPT

## 2018-02-21 PROCEDURE — 93010 ELECTROCARDIOGRAM REPORT: CPT | Performed by: INTERNAL MEDICINE

## 2018-02-21 PROCEDURE — 99284 EMERGENCY DEPT VISIT MOD MDM: CPT

## 2018-02-21 RX ORDER — TRIAMCINOLONE ACETONIDE 1 MG/G
CREAM TOPICAL
COMMUNITY
End: 2018-08-24 | Stop reason: SDUPTHER

## 2018-02-21 RX ORDER — HYDROXYZINE HYDROCHLORIDE 10 MG/1
25 TABLET, FILM COATED ORAL EVERY 8 HOURS PRN
COMMUNITY
End: 2018-09-19 | Stop reason: SDUPTHER

## 2018-02-21 RX ORDER — LANOLIN ALCOHOL/MO/W.PET/CERES
CREAM (GRAM) TOPICAL DAILY
COMMUNITY

## 2018-02-21 NOTE — ED NOTES
Patient changed and repositioned at this time  Resting comfortably, awaiting transport back to [facility]        Jomar Marquez RN  02/21/18 [id number]

## 2018-02-21 NOTE — ED NOTES
[facility] receiving RN aware of patients plan of care and updated on status to be returning back to facility at this time       Roopa Velasquez RN  02/21/18 5119

## 2018-02-21 NOTE — ED PROVIDER NOTES
History  Chief Complaint   Patient presents with    Fall     Patient comes from [facility] after an staff found him laying next to his bed following an unwittnessed fall  Patient with dementia hx, currently at baseline per staff  Patient takes 81 mg ASA, currently w/o complaints at this time  81 yo M resident of [facility] dementia unit presents to ED after unwitnessed fall  Pt was found lying on the ground next to his bed  Pt says he got "hit in the head somehow and then went unconscious and fell while I was unconscious " He does not remember falling  Pt complains of forehead pain and neck pain  Denies chest pain, shortness of breath, or abdominal pain  Per chart review pt on ASA, no anticoagulation  Prior to Admission Medications   Prescriptions Last Dose Informant Patient Reported? Taking?    LORazepam (ATIVAN) 0 5 mg tablet Unknown at Unknown time  Yes No   Sig: Take 0 25 mg by mouth every 4 (four) hours as needed for anxiety   QUEtiapine (SEROquel) 25 mg tablet 2/20/2018 at Unknown time  Yes Yes   Sig: Take 12 5 mg by mouth 2 (two) times a day   aspirin (ECOTRIN LOW STRENGTH) 81 mg EC tablet Unknown at Unknown time  Yes No   Sig: Take 81 mg by mouth daily   azithromycin (ZITHROMAX) 250 mg tablet 2/20/2018 at Unknown time  No Yes   Sig: Take 2 tablets today then 1 tablet daily x 4 days   cefpodoxime (VANTIN) 200 mg tablet 2/20/2018 at Unknown time  Yes Yes   Sig: Take 200 mg by mouth 2 (two) times a day For 5 days, end 10/11   guaiFENesin (ROBITUSSIN) 100 mg/5 mL syrup 2/20/2018 at Unknown time  Yes Yes   Sig: Take 200 mg by mouth 4 (four) times a day as needed for cough X 5 days, end 10/9   hydrOXYzine HCL (ATARAX) 10 mg tablet   Yes Yes   Sig: Take 25 mg by mouth every 8 (eight) hours as needed for itching   levothyroxine 100 mcg tablet 2/20/2018 at Unknown time  Yes Yes   Sig: Take 137 mcg by mouth daily   memantine (NAMENDA) 10 mg tablet 2/20/2018 at Unknown time  Yes Yes Sig: Take 5 mg by mouth 2 (two) times a day     metoprolol succinate (TOPROL-XL) 50 mg 24 hr tablet 2/20/2018 at Unknown time  Yes Yes   Sig: Take 25 mg by mouth daily   permethrin (ELIMITE) 5 % cream 2/20/2018 at Unknown time  No Yes   Sig: Apply to affected area once   traZODone (DESYREL) 50 mg tablet 2/20/2018 at Unknown time  Yes Yes   Sig: Take 50 mg by mouth daily   triamcinolone (KENALOG) 0 1 % cream   Yes Yes   Sig: Apply topically daily at bedtime   white petrolatum-corn starch-lanolin (TRIPLE PASTE) 12 8 % ointment   Yes Yes   Sig: Apply topically as needed for irritation   white petrolatum-mineral oil (EUCERIN,HYDROCERIN) cream   Yes Yes   Sig: Apply topically daily      Facility-Administered Medications: None       Past Medical History:   Diagnosis Date    Alzheimer disease     Atrial fibrillation (HCC)     Edema extremities     Hyperlipidemia     Hypertension        History reviewed  No pertinent surgical history  History reviewed  No pertinent family history  I have reviewed and agree with the history as documented  Social History   Substance Use Topics    Smoking status: Never Smoker    Smokeless tobacco: Never Used    Alcohol use No        Review of Systems   Unable to perform ROS: Dementia       Physical Exam  ED Triage Vitals [02/21/18 0141]   Temperature Pulse Respirations Blood Pressure SpO2   98 2 °F (36 8 °C) 55 20 (!) 203/85 94 %      Temp Source Heart Rate Source Patient Position - Orthostatic VS BP Location FiO2 (%)   Oral Monitor Lying Right arm --      Pain Score       No Pain           Orthostatic Vital Signs  Vitals:    02/21/18 0141 02/21/18 0149 02/21/18 0215 02/21/18 0245   BP: (!) 203/85 (!) 183/73 (!) 186/80 (!) 180/81   Pulse: 55  60    Patient Position - Orthostatic VS: Lying  Lying Lying       Physical Exam   Constitutional: No distress  Elderly appearing male   HENT:   Head: Head is with abrasion (forehead skin tear)   Head is without raccoon's eyes and without Ortega's sign  Right Ear: No hemotympanum  Left Ear: No hemotympanum  Mouth/Throat: Oropharynx is clear and moist    Eyes: Conjunctivae and EOM are normal  Right eye exhibits no discharge  Left eye exhibits no discharge  Neck:   No midline neck tenderness to palpation  C-collar placed   Cardiovascular: Normal rate, regular rhythm and intact distal pulses  Pulmonary/Chest: Effort normal  No respiratory distress  He exhibits no tenderness  Abdominal: Soft  There is no tenderness  There is no rebound and no guarding  Musculoskeletal: Normal range of motion  He exhibits tenderness (R thumb)  He exhibits no edema or deformity  Neurological: He is alert  No sensory deficit  Skin: Skin is warm and dry  Skin tear forehead  Bilateral knee abrasions  Small subungual hematoma R thumb   Nursing note and vitals reviewed  ED Medications  Medications - No data to display    Diagnostic Studies  Results Reviewed     None                 CT head without contrast    (Results Pending)   CT spine cervical without contrast    (Results Pending)         Procedures  ECG 12 Lead Documentation  Date/Time: 2/21/2018 2:11 AM  Performed by: Melissa Evans  Authorized by: Karin Michael     Indications / Diagnosis:  Unwitnessed fall  ECG reviewed by me, the ED Provider: yes    Patient location:  ED  Previous ECG:     Previous ECG:  Compared to current  Rate:     ECG rate:  57    ECG rate assessment: bradycardic    Rhythm:     Rhythm: sinus rhythm    ST segments:     ST segments:  Normal  T waves:     T waves: normal            Phone Consults  ED Phone Contact    ED Course  ED Course            Identification of Seniors at Risk    Flowsheet Row Most Recent Value   (ISAR) Identification of Seniors at Risk   Before the illness or injury that brought you to the Emergency, did you need someone to help you on a regular basis?   1 Filed at: 02/21/2018 0142   In the last 24 hours, have you needed more help than usual?  0 Filed at: 02/21/2018 0142   Have you been hospitalized for one or more nights during the past 6 months? 1 Filed at: 02/21/2018 0142   In general, do you see well?  0 Filed at: 02/21/2018 0142   In general, do you have serious problems with your memory? 1 Filed at: 02/21/2018 0142   Do you take more than three different medications every day? 1 Filed at: 02/21/2018 0142   ISAR Score  4 Filed at: 02/21/2018 0142                          MDM  Number of Diagnoses or Management Options  Diagnosis management comments: CT head and neck negative for acute process  EKG without evidence of acute ischemia  Will discharge pt back to nursing facility  CritCare Time    Disposition  Final diagnoses:   Closed head injury, initial encounter   Abrasion     Time reflects when diagnosis was documented in both MDM as applicable and the Disposition within this note     Time User Action Codes Description Comment    2/21/2018  3:20 AM Roni Norris Add [S09 [id number]] Closed head injury, initial encounter     2/21/2018  3:22 AM Neto Mayer, [facility]  [address]] Abrasion       ED Disposition     ED Disposition Condition Comment    Discharge  Amparo Khoury  discharge to home/self care  Condition at discharge: Stable        Follow-up Information     Follow up With Specialties Details Why Contact Info Additional [address] Emergency Department Emergency Medicine  If symptoms worsen, As needed [facility] ED, [address], [address]        Patient's Medications   Discharge Prescriptions    No medications on file     No discharge procedures on file  ED Provider  Attending physically available and evaluated Amparo Khoury I managed the patient along with the ED Attending      Electronically Signed by         Lucila Wiggins MD  02/21/18 4824

## 2018-02-21 NOTE — ED NOTES
Dr Marysol Mason at the bedside for patient evaluation at this time     George Santos, RN  02/21/18 0228

## 2018-02-21 NOTE — ED NOTES
[facility] arrived for patient transport   Report given to transport team       April Hope RN  02/21/18 1193

## 2018-02-21 NOTE — DISCHARGE INSTRUCTIONS

## 2018-02-21 NOTE — EMTALA/ACUTE CARE TRANSFER
[address]   [place] [address]  Dept: [address] TRANSFER CONSENT    NAME Mane Carpenter   1926                              MRN [id number]    I have been informed of my rights regarding examination, treatment, and transfer   by Dr Arden Leal MD    Benefits: Continuity of care    Risks: Increased discomfort during transfer      Consent for Transfer:  I acknowledge that my medical condition has been evaluated and explained to me by the emergency department physician or other qualified medical person and/or my attending physician, who has recommended that I be transferred to the service of    at [address] [facility] Name, [address] : [facility]  The above potential benefits of such transfer, the potential risks associated with such transfer, and the probable risks of not being transferred have been explained to me, and I fully understand them  The doctor has explained that, in my case, the benefits of transfer outweigh the risks  I agree to be transferred  I authorize the performance of emergency medical procedures and treatments upon me in both transit and upon arrival at the receiving facility  Additionally, I authorize the release of any and all medical records to the receiving facility and request they be transported with me, if possible  I understand that the safest mode of transportation during a medical emergency is an ambulance and that the Hospital advocates the use of this mode of transport  Risks of traveling to the receiving facility by car, including absence of medical control, life sustaining equipment, such as oxygen, and medical personnel has been explained to me and I fully understand them  (DEYSI CORRECT BOX BELOW)  [  ]  I consent to the stated transfer and to be transported by ambulance/helicopter    [  ]  I consent to the stated transfer, but refuse transportation by ambulance and accept full responsibility for my transportation by car  I understand the risks of non-ambulance transfers and I exonerate the Hospital and its staff from any deterioration in my condition that results from this refusal     X___________________________________________    DATE  18  TIME________  Signature of patient or legally responsible individual signing on patient behalf           RELATIONSHIP TO PATIENT_________________________          Provider Certification    NAME Rashmi Conteh   1926                              MRN [id number]    A medical screening exam was performed on the above named patient  Based on the examination:    Condition Necessitating Transfer The primary encounter diagnosis was Closed head injury, initial encounter  A diagnosis of Abrasion was also pertinent to this visit  Patient Condition: The patient has been stabilized such that within reasonable medical probability, no material deterioration of the patient condition or the condition of the unborn child(fozia) is likely to result from the transfer    Reason for Transfer: Other (Include comment)____________________ (returning to residence)    Transfer Requirements: [address]   · Space available and qualified personnel available for treatment as acknowledged by    · Agreed to accept transfer and to provide appropriate medical treatment as acknowledged by          · Appropriate medical records of the examination and treatment of the patient are provided at the time of transfer   [address] [facility], [address] _______  · Transfer will be performed by qualified personnel from    and appropriate transfer equipment as required, including the use of necessary and appropriate life support measures      Provider Certification: I have examined the patient and explained the following risks and benefits of being transferred/refusing transfer to the patient/family:  General risk, such as traffic hazards, adverse weather conditions, rough terrain or turbulence, possible failure of equipment (including vehicle or aircraft), or consequences of actions of persons outside the control of the transport personnel      Based on these reasonable risks and benefits to the patient and/or the unborn child(fozia), and based upon the information available at the time of the patients examination, I certify that the medical benefits reasonably to be expected from the provision of appropriate medical treatments at another medical facility outweigh the increasing risks, if any, to the individuals medical condition, and in the case of labor to the unborn child, from effecting the transfer      X____________________________________________ DATE 02/21/18        TIME_______      ORIGINAL - SEND TO MEDICAL RECORDS   COPY - SEND WITH PATIENT DURING TRANSFER

## 2018-02-21 NOTE — ED ATTENDING ATTESTATION
Petra Domingo MD, saw and evaluated the patient  I have discussed the patient with the resident/non-physician practitioner and agree with the resident's/non-physician practitioner's findings, Plan of Care, and MDM as documented in the resident's/non-physician practitioner's note, except where noted  All available labs and Radiology studies were reviewed  At this point I agree with the current assessment done in the Emergency Department  I have conducted an independent evaluation of this patient including a focused history of:    Emergency Department Note- Britany Bingham  80 y o  male MRN: [id number]    Unit/Bed#: ED 18 Encounter: [id number]    Britany Bingham  is a 80 y o  male who presents with   Chief Complaint   Patient presents with    Fall     Patient comes from [facility] after an staff found him laying next to his bed following an unwittnessed fall  Patient with dementia hx, currently at baseline per staff  Patient takes 81 mg ASA, currently w/o complaints at this time  History of Present Illness   HPI:  Britany Bingham  is a 80 y o  male who presents for evaluation of:    A fall at [facility]  The patient lives in a locked dementia unit  He had an unwitnessed fall  The patient is unable to provide any history or review of systems secondary to his dementia and delirium  According to the staff, the patient is at his baseline mental status  The patient takes an aspirin a day  He denies any specific complaints in the emergency department  Review of Systems   Unable to perform ROS: Dementia (Review of systems is unobtainable secondary to dementia and delirium)       Historical Information   Past Medical History:   Diagnosis Date    Alzheimer disease     Atrial fibrillation ([address] )     Edema extremities     Hyperlipidemia     Hypertension      History reviewed  No pertinent surgical history    Social History   History   Alcohol Use No     History   Drug Use No     History   Smoking Status    Never Smoker   Smokeless Tobacco    Never Used     Family History: non-contributory    Meds/Allergies   all medications and allergies reviewed  No Known Allergies    Objective   First Vitals:   Blood Pressure: (!) 203/85 (18)  Pulse: 55 (18)  Temperature: 98 2 °F (36 8 °C) (18)  Temp Source: Oral (18)  Respirations: 20 (18)  Weight - Scale: 83 9 kg (185 lb) (18)  SpO2: 94 % (18)    Current Vitals:   Blood Pressure: (!) 183/73 (189)  Pulse: 55 (18)  Temperature: 98 2 °F (36 8 °C) (18)  Temp Source: Oral (18)  Respirations: 20 (18)  Weight - Scale: 83 9 kg (185 lb) (18)  SpO2: 94 % (18)    No intake or output data in the 24 hours ending 18 0207    Invasive Devices          No matching active lines, drains, or airways          Physical Exam   Constitutional: He appears well-developed and well-nourished  No distress  HENT:   Right Ear: External ear normal    Left Ear: External ear normal    The patient has an abrasion across his forehead   Eyes: Conjunctivae are normal  Pupils are equal, round, and reactive to light  Cardiovascular: Normal rate and regular rhythm  Pulmonary/Chest: Effort normal and breath sounds normal    Abdominal: Soft  Bowel sounds are normal    Musculoskeletal: Normal range of motion  He exhibits no deformity  Neurological: He is alert  The patient is only oriented to name but not to place or to date   Skin: Skin is warm and dry  Psychiatric:   Decision making capacity, thought content, judgment are impaired secondary to dementia and delirium   Nursing note and vitals reviewed  Medical Decision Makin  Acute fall with closed head trauma:  CT scan head to rule out intracranial injury; CT scan C-spine to rule out C-spine fracture      No results found for this or any previous visit (from the past 36 hour(s))  No orders to display         Portions of the record may have been created with voice recognition software  Occasional wrong word or "sound a like" substitutions may have occurred due to the inherent limitations of voice recognition software  Read the chart carefully and recognize, using context, where substitutions have occurred

## 2018-02-23 ENCOUNTER — HOSPITAL ENCOUNTER (EMERGENCY)
Facility: HOSPITAL | Age: 83
Discharge: HOME | End: 2018-02-23
Attending: EMERGENCY MEDICINE | Admitting: EMERGENCY MEDICINE
Payer: MEDICARE

## 2018-02-23 ENCOUNTER — HOSPITAL ENCOUNTER (EMERGENCY)
Facility: HOSPITAL | Age: 83
Discharge: NURSING, REHAB OR LONG-TERM CARE | End: 2018-02-23
Attending: EMERGENCY MEDICINE
Payer: MEDICARE

## 2018-02-23 ENCOUNTER — APPOINTMENT (EMERGENCY)
Dept: RADIOLOGY | Facility: HOSPITAL | Age: 83
End: 2018-02-23
Payer: MEDICARE

## 2018-02-23 VITALS
RESPIRATION RATE: 18 BRPM | OXYGEN SATURATION: 94 % | HEART RATE: 69 BPM | TEMPERATURE: 97.4 F | SYSTOLIC BLOOD PRESSURE: 147 MMHG | DIASTOLIC BLOOD PRESSURE: 75 MMHG

## 2018-02-23 VITALS
WEIGHT: 184.97 LBS | HEIGHT: 71 IN | TEMPERATURE: 98 F | DIASTOLIC BLOOD PRESSURE: 86 MMHG | HEART RATE: 80 BPM | OXYGEN SATURATION: 94 % | BODY MASS INDEX: 25.9 KG/M2 | SYSTOLIC BLOOD PRESSURE: 182 MMHG | RESPIRATION RATE: 18 BRPM
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
## 2018-02-23 DIAGNOSIS — S00.81XA ABRASION OF FOREHEAD, INITIAL ENCOUNTER: ICD-10-CM
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
## 2018-02-23 PROCEDURE — 72125 CT NECK SPINE W/O DYE: CPT

## 2018-02-23 PROCEDURE — 99284 EMERGENCY DEPT VISIT MOD MDM: CPT

## 2018-02-23 PROCEDURE — 70450 CT HEAD/BRAIN W/O DYE: CPT

## 2018-02-23 NOTE — ED ATTENDING ATTESTATION
I, [address] [place] [address], DO, saw and evaluated the patient  I have discussed the patient with the resident/non-physician practitioner and agree with the resident's/non-physician practitioner's findings, Plan of Care, and MDM as documented in the resident's/non-physician practitioner's note, except where noted  All available labs and Radiology studies were reviewed  At this point I agree with the current assessment done in the Emergency Department  I have conducted an independent evaluation of this patient a history and physical is as follows:    49-year-old male presents status post fall  Patient rolled out of bed  Denies loss of consciousness  Complains of head pain where he hit his head  Patient is frustrated as this is his 2nd visit emergency department tonight for the same  On exam-no acute distress, abrasion to frontal part of scalp, heart regular, no respiratory distress    Plan-CT head C-spine    Critical Care Time  CritCare Time    Procedures

## 2018-02-23 NOTE — ED ATTENDING ATTESTATION
I, [address], DO, saw and evaluated the patient  I have discussed the patient with the resident/non-physician practitioner and agree with the resident's/non-physician practitioner's findings, Plan of Care, and MDM as documented in the resident's/non-physician practitioner's note, except where noted  All available labs and Radiology studies were reviewed  At this point I agree with the current assessment done in the Emergency Department  I have conducted an independent evaluation of this patient a history and physical is as follows:    79yo male presents s/p fall out of bed  Pt states he just rolled out of bed  Denies loc, denies hitting head  On exam - nad, abrasion to forehead, heart reg, no resp distress, abd soft and nt    Plan - CT head and cspine    Critical Care Time  CritCare Time    Procedures

## 2018-02-23 NOTE — ED PROVIDER NOTES
History  Chief Complaint   Patient presents with    Fall     Pt rolled out of bed and landed on the floor  No c/o pain      This is a 27-year-old male with a past medical history of Alzheimer's disease who presents to the emergency room from [facility] after a fall out of bed  Patient states that he was trying to get out of bed and he just fell to the ground  He denies hitting his head but cannot explain the red /ecchymotic spot on his forehead and he also notes a headache with some mild neck pain  Patient denies any pain in his arms, legs, hips abdomen, chest, or back  Patient denies any vision changes, ear pain sore throat tooth pain, shortness of breath nausea/vomiting, and no numbness weakness or tingling  Prior to Admission Medications   Prescriptions Last Dose Informant Patient Reported? Taking?    LORazepam (ATIVAN) 0 5 mg tablet 2/22/2018 at Unknown time  Yes Yes   Sig: Take 0 25 mg by mouth every 4 (four) hours as needed for anxiety   QUEtiapine (SEROquel) 25 mg tablet 2/22/2018 at Unknown time  Yes Yes   Sig: Take 12 5 mg by mouth 2 (two) times a day   aspirin (ECOTRIN LOW STRENGTH) 81 mg EC tablet 2/22/2018 at Unknown time  Yes Yes   Sig: Take 81 mg by mouth daily   azithromycin (ZITHROMAX) 250 mg tablet 2/22/2018 at Unknown time  No Yes   Sig: Take 2 tablets today then 1 tablet daily x 4 days   cefpodoxime (VANTIN) 200 mg tablet 2/22/2018 at Unknown time  Yes Yes   Sig: Take 200 mg by mouth 2 (two) times a day For 5 days, end 10/11   guaiFENesin (ROBITUSSIN) 100 mg/5 mL syrup 2/22/2018 at Unknown time  Yes Yes   Sig: Take 200 mg by mouth 4 (four) times a day as needed for cough X 5 days, end 10/9   hydrOXYzine HCL (ATARAX) 10 mg tablet 2/22/2018 at Unknown time  Yes Yes   Sig: Take 25 mg by mouth every 8 (eight) hours as needed for itching   levothyroxine 100 mcg tablet 2/22/2018 at Unknown time  Yes Yes   Sig: Take 137 mcg by mouth daily   memantine (NAMENDA) 10 mg tablet 2/22/2018 at Unknown time  Yes Yes   Sig: Take 5 mg by mouth 2 (two) times a day     metoprolol succinate (TOPROL-XL) 50 mg 24 hr tablet 2/22/2018 at Unknown time  Yes Yes   Sig: Take 25 mg by mouth daily   permethrin (ELIMITE) 5 % cream 2/22/2018 at Unknown time  No Yes   Sig: Apply to affected area once   traZODone (DESYREL) 50 mg tablet 2/22/2018 at Unknown time  Yes Yes   Sig: Take 50 mg by mouth daily   triamcinolone (KENALOG) 0 1 % cream 2/22/2018 at Unknown time  Yes Yes   Sig: Apply topically daily at bedtime   white petrolatum-corn starch-lanolin (TRIPLE PASTE) 12 8 % ointment   Yes No   Sig: Apply topically as needed for irritation   white petrolatum-mineral oil (EUCERIN,HYDROCERIN) cream 2/22/2018 at Unknown time  Yes Yes   Sig: Apply topically daily      Facility-Administered Medications: None       Past Medical History:   Diagnosis Date    Alzheimer disease     Atrial fibrillation ([facility] [address] )     Edema extremities     Hyperlipidemia     Hypertension        History reviewed  No pertinent surgical history  History reviewed  No pertinent family history  I have reviewed and agree with the history as documented  Social History   Substance Use Topics    Smoking status: Never Smoker    Smokeless tobacco: Never Used    Alcohol use No        Review of Systems   Constitutional: Negative for chills, diaphoresis and fever  HENT: Negative for congestion, rhinorrhea, sinus pressure and sore throat  Eyes: Negative for visual disturbance  Respiratory: Negative for cough, chest tightness and shortness of breath  Cardiovascular: Negative for chest pain  Gastrointestinal: Negative for abdominal pain, constipation, diarrhea, nausea and vomiting  Genitourinary: Negative for dysuria, frequency, hematuria and urgency  Musculoskeletal: Positive for neck pain  Negative for arthralgias and myalgias  Skin: Negative for color change and rash  Neurological: Positive for headaches   Negative for dizziness and numbness  Physical Exam  ED Triage Vitals [02/23/18 0030]   Temperature Pulse Respirations Blood Pressure SpO2   98 °F (36 7 °C) 81 18 164/92 96 %      Temp Source Heart Rate Source Patient Position - Orthostatic VS BP Location FiO2 (%)   Oral Monitor Lying Right arm --      Pain Score       No Pain           Orthostatic Vital Signs  Vitals:    02/23/18 0030 02/23/18 0130   BP: 164/92 (!) 182/86   Pulse: 81 80   Patient Position - Orthostatic VS: Lying Lying       Physical Exam   Constitutional: He appears well-developed and well-nourished  No distress  HENT:   Head: Normocephalic  Ecchymotic/erythematous lesion about the size of a quarter on the patient's forehead  Eyes: Conjunctivae are normal  Pupils are equal, round, and reactive to light  Cardiovascular: Normal rate, regular rhythm and normal heart sounds  Exam reveals no gallop and no friction rub  No murmur heard  Pulmonary/Chest: Effort normal and breath sounds normal  No respiratory distress  He has no wheezes  He has no rales  Abdominal: Soft  Bowel sounds are normal  He exhibits no distension  There is no tenderness  There is no guarding  Musculoskeletal: Normal range of motion  He exhibits no edema, tenderness or deformity  Neurological: He is alert  No cranial nerve deficit or sensory deficit  He exhibits normal muscle tone  Patient oriented to self and place only  He is unable to determine the year and does not know who the president is  Skin: Skin is warm and dry  Psychiatric: He has a normal mood and affect  His behavior is normal    Nursing note and vitals reviewed  ED Medications  Medications - No data to display    Diagnostic Studies  Results Reviewed     None                 CT cervical spine without contrast   Final Result by Kofi Jaime MD (02/23 0725)      No cervical spine fracture or traumatic malalignment           Findings are consistent with the preliminary report from [facility] which was provided shortly after completion of the exam                 Workstation performed: [id number]         CT head without contrast   Final Result by Eliot Whitfield MD (02/23 6454)      No acute intracranial abnormality  Findings are consistent with the preliminary report from [facility] which was provided shortly after completion of the exam          Workstation performed: [id number]               Procedures  Procedures      Phone Consults  ED Phone Contact    ED Course  ED Course                                MDM  Number of Diagnoses or Management Options  Fall at nursing home, initial encounter:   Diagnosis management comments: Patient's head CT and cervical spine CT were unremarkable  Patient was discharged back to [facility] in good condition using [facility] for transport  CritCare Time    Disposition  Final diagnoses:   Fall at nursing home, initial encounter     Time reflects when diagnosis was documented in both MDM as applicable and the Disposition within this note     Time User Action Codes Description Comment    2/23/2018  2:18 AM Edwina Mercury Add [M37  Sandeep Drones Fall at nursing home, initial encounter       ED Disposition     ED Disposition Condition Comment    Discharge  [place]  discharge to home/self care      Condition at discharge: Good        Follow-up Information     Follow up With Specialties Details Why [address], MD Family Medicine   [address]  [phone]          Discharge Medication List as of 2/23/2018  2:19 AM      CONTINUE these medications which have NOT CHANGED    Details   aspirin (ECOTRIN LOW STRENGTH) 81 mg EC tablet Take 81 mg by mouth daily, Historical Med      azithromycin (ZITHROMAX) 250 mg tablet Take 2 tablets today then 1 tablet daily x 4 days, Print      cefpodoxime (VANTIN) 200 mg tablet Take 200 mg by mouth 2 (two) times a day For 5 days, end 10/11, Historical Med guaiFENesin (ROBITUSSIN) 100 mg/5 mL syrup Take 200 mg by mouth 4 (four) times a day as needed for cough X 5 days, end 10/9, Historical Med      hydrOXYzine HCL (ATARAX) 10 mg tablet Take 25 mg by mouth every 8 (eight) hours as needed for itching, Historical Med      levothyroxine 100 mcg tablet Take 137 mcg by mouth daily, Historical Med      LORazepam (ATIVAN) 0 5 mg tablet Take 0 25 mg by mouth every 4 (four) hours as needed for anxiety, Historical Med      memantine (NAMENDA) 10 mg tablet Take 5 mg by mouth 2 (two) times a day  , Historical Med      metoprolol succinate (TOPROL-XL) 50 mg 24 hr tablet Take 25 mg by mouth daily, Historical Med      permethrin (ELIMITE) 5 % cream Apply to affected area once, Print      QUEtiapine (SEROquel) 25 mg tablet Take 12 5 mg by mouth 2 (two) times a day, Historical Med      traZODone (DESYREL) 50 mg tablet Take 50 mg by mouth daily, Historical Med      triamcinolone (KENALOG) 0 1 % cream Apply topically daily at bedtime, Historical Med      white petrolatum-mineral oil (EUCERIN,HYDROCERIN) cream Apply topically daily, Historical Med      white petrolatum-corn starch-lanolin (TRIPLE PASTE) 12 8 % ointment Apply topically as needed for irritation, Historical Med           No discharge procedures on file  ED Provider  Attending physically available and evaluated Peter Almazan I managed the patient along with the ED Attending      Electronically Signed by         Margo Medina MD  02/23/18 1517

## 2018-02-23 NOTE — DISCHARGE INSTRUCTIONS
Abrasion   WHAT YOU NEED TO KNOW:   What is an abrasion? An abrasion is a scrape on your skin  It happens when your skin rubs against a rough surface  Some examples of an abrasion include rug burn, a skinned elbow, or road rash  Abrasions can be many shapes and sizes  The wound may hurt, bleed, bruise, or swell  How can I care for my abrasion? · Wash your hands and dry them with a clean towel  · Press a clean cloth against your wound to stop any bleeding  · Rinse your wound with a lot of clean water  Do not use harsh soap, alcohol, or iodine solutions  · Use a clean, wet cloth to remove any objects, such as small pieces of rocks or dirt  · Rub antibiotic ointment on your wound  This may help prevent infection and help your wound heal     · Cover the wound with a non-stick bandage  Change the bandage daily, and if gets wet or dirty  When should I seek immediate care? · The bleeding does not stop after 10 minutes of firm pressure  · You cannot rinse one or more foreign objects out of your wound  · You have red streaks on your skin coming from your wound  When should I contact my healthcare provider? · You have a fever or chills  · Your abrasion is red, warm, swollen, or draining pus  · You have questions or concerns about your condition or care  CARE AGREEMENT:   You have the right to help plan your care  Learn about your health condition and how it may be treated  Discuss treatment options with your caregivers to decide what care you want to receive  You always have the right to refuse treatment  The above information is an  only  It is not intended as medical advice for individual conditions or treatments  Talk to your doctor, nurse or pharmacist before following any medical regimen to see if it is safe and effective for you    © 2017 [employer] Information is for End User's use only and may not be sold, redistributed or otherwise used for commercial purposes  All illustrations and images included in CareNotes® are the copyrighted property of A D [employer] M , [employer]  or [employer]  Fall Prevention for Older Adults   WHAT YOU NEED TO KNOW:   What is fall prevention? Fall prevention includes ways to make your home and other areas safer  It also includes ways you can move more carefully to prevent a fall  What increases my risk for falls? As you age, your muscles weaken and your risk for falls increases  Your risk also increases if you take medicines that make you sleepy or dizzy  You may also be at risk if you have vision or joint problems, have low blood pressure, or are not active  How can I help protect myself from falls? Falls are a common cause of injury for older adults  Do the following to help protect yourself:  · Stay active  Exercise can help strengthen your muscles and improve your balance  Your healthcare provider may recommend water aerobics, walking, or [employer]  He may also recommend physical therapy to improve your coordination  Never start an exercise program without asking your healthcare provider first      · Wear shoes that fit well and have soles that   Wear shoes both inside and outside  Use slippers with good   Avoid shoes with high heels  · Use assistive devices as directed  Your healthcare provider may suggest that you use a cane or walker to help you keep your balance  You may need to have grab bars put in your bathroom near the toilet or in the shower  · Stand or sit up slowly  This may help you keep your balance and prevent falls  · Wear a personal alarm  This is a device that allows you to call 911 if you need help  Ask your healthcare provider for more information  · Manage your medical conditions  Keep all appointments with your healthcare providers  Visit your eye doctor as directed  How can I make my home safer? · Add items to prevent falls in the bathroom    Put nonslip strips on your bath or shower floor to prevent you from slipping  Use a bath mat if you do not have carpet in the bathroom  This will prevent you from falling when you step out of the bath or shower  Use a shower seat so you do not need to stand while you shower  Sit on the toilet or a chair in your bathroom to dry yourself and put on clothing  This will prevent you from losing your balance from drying or dressing yourself while you are standing  · Keep paths clear  Remove books, shoes, and other objects from walkways and stairs  Place cords for telephones and lamps out of the way so that you do not need to walk over them  Tape them down if you cannot move them  Remove small rugs  If you cannot remove a rug, secure it with double-sided tape  This will prevent you from tripping  · Install bright lights in your home  Use night lights to help light paths to the bathroom or kitchen  Always turn on the light before you start walking  · Keep items you use often on shelves within reach  Do not use a step stool to help you reach an item  · Paint or place reflective tape on the edges of your stairs  This will help you see the stairs better  Call 911 or have someone else call if:   · You have fallen and are unconscious  · You have fallen and cannot move part of your body  When should I contact my healthcare provider? · You have fallen and have pain or a headache  · You have questions or concerns about your condition or care  CARE AGREEMENT:   You have the right to help plan your care  Learn about your health condition and how it may be treated  Discuss treatment options with your caregivers to decide what care you want to receive  You always have the right to refuse treatment  The above information is an  only  It is not intended as medical advice for individual conditions or treatments   Talk to your doctor, nurse or pharmacist before following any medical regimen to see if it is safe and effective for you  © 2017 [address] [employer] Information is for End User's use only and may not be sold, redistributed or otherwise used for commercial purposes  All illustrations and images included in CareNotes® are the copyrighted property of A D A M , Inc  or [employer] Dan

## 2018-02-23 NOTE — SOCIAL WORK
Mariah spoke with Pt's daughter, Shahla Thao she reported that she is happy with the care at [facility] but is concerned with his frequent ER visits  She reported that [facility] will not allow the patient to have rails on the bed  Pt's daughter is buying mats to put on the floor and they have the Pt's bed as low as possible  Pt is currently on a Advanced 2 higher level of care for independent living  He has room checks every 2 hours  Pt's daughter will discuss options with [facility] and if it persists then she will look in to a higher level of care  Pt's new address is [address] [place] [address], [facility] [address] [facility] [address]  Pt's daughter aware Pt will be returning to [facility]  MARIAH spoke with Bernardo Jones for [facility] transport back to the facility

## 2018-02-23 NOTE — ED PROVIDER NOTES
History  Chief Complaint   Patient presents with    Fall     Pt rolled out of bed and bumped his head  Abrasion noted to scalp     79 yo M with PMH dementia presents to ED from [facility] after he again rolled out of bed and hit his head  Pt was seen earlier today for same thing, returned to facility, and rolled out of his bed again within 2 hrs of getting back  Pt denies any complaints at this time  He is frustrated and says maybe he should tie himself to his bed or just sleep on the floor  No nausea/vomiting, headache, neck pain, vision change  At baseline mental status  Not on anticoagulation  Prior to Admission Medications   Prescriptions Last Dose Informant Patient Reported? Taking?    LORazepam (ATIVAN) 0 5 mg tablet 2/22/2018 at Unknown time  Yes Yes   Sig: Take 0 25 mg by mouth every 4 (four) hours as needed for anxiety   QUEtiapine (SEROquel) 25 mg tablet 2/22/2018 at Unknown time  Yes Yes   Sig: Take 12 5 mg by mouth 2 (two) times a day   aspirin (ECOTRIN LOW STRENGTH) 81 mg EC tablet 2/22/2018 at Unknown time  Yes Yes   Sig: Take 81 mg by mouth daily   azithromycin (ZITHROMAX) 250 mg tablet 2/22/2018 at Unknown time  No Yes   Sig: Take 2 tablets today then 1 tablet daily x 4 days   cefpodoxime (VANTIN) 200 mg tablet 2/22/2018 at Unknown time  Yes Yes   Sig: Take 200 mg by mouth 2 (two) times a day For 5 days, end 10/11   guaiFENesin (ROBITUSSIN) 100 mg/5 mL syrup 2/22/2018 at Unknown time  Yes Yes   Sig: Take 200 mg by mouth 4 (four) times a day as needed for cough X 5 days, end 10/9   hydrOXYzine HCL (ATARAX) 10 mg tablet 2/22/2018 at Unknown time  Yes Yes   Sig: Take 25 mg by mouth every 8 (eight) hours as needed for itching   levothyroxine 100 mcg tablet 2/22/2018 at Unknown time  Yes Yes   Sig: Take 137 mcg by mouth daily   memantine (NAMENDA) 10 mg tablet 2/22/2018 at Unknown time  Yes Yes   Sig: Take 5 mg by mouth 2 (two) times a day     metoprolol succinate (TOPROL-XL) 50 mg 24 hr tablet 2/22/2018 at Unknown time  Yes Yes   Sig: Take 25 mg by mouth daily   permethrin (ELIMITE) 5 % cream 2/22/2018 at Unknown time  No Yes   Sig: Apply to affected area once   traZODone (DESYREL) 50 mg tablet 2/22/2018 at Unknown time  Yes Yes   Sig: Take 50 mg by mouth daily   triamcinolone (KENALOG) 0 1 % cream 2/22/2018 at Unknown time  Yes Yes   Sig: Apply topically daily at bedtime   white petrolatum-corn starch-lanolin (TRIPLE PASTE) 12 8 % ointment 2/22/2018 at Unknown time  Yes Yes   Sig: Apply topically as needed for irritation   white petrolatum-mineral oil (EUCERIN,HYDROCERIN) cream 2/22/2018 at Unknown time  Yes Yes   Sig: Apply topically daily      Facility-Administered Medications: None       Past Medical History:   Diagnosis Date    Alzheimer disease     Atrial fibrillation ([facility] [address] )     Edema extremities     Hyperlipidemia     Hypertension        History reviewed  No pertinent surgical history  History reviewed  No pertinent family history  I have reviewed and agree with the history as documented      Social History   Substance Use Topics    Smoking status: Never Smoker    Smokeless tobacco: Never Used    Alcohol use No        Review of Systems   Unable to perform ROS: Dementia       Physical Exam  ED Triage Vitals   Temperature Pulse Respirations Blood Pressure SpO2   02/23/18 0617 02/23/18 0617 02/23/18 0617 02/23/18 0617 02/23/18 0617   (!) 97 4 °F (36 3 °C) 55 18 161/94 98 %      Temp Source Heart Rate Source Patient Position - Orthostatic VS BP Location FiO2 (%)   02/23/18 0617 02/23/18 0617 02/23/18 0617 02/23/18 0730 --   Oral Monitor Sitting Right arm       Pain Score       02/23/18 0700       No Pain           Orthostatic Vital Signs  Vitals:    02/23/18 0730 02/23/18 0830 02/23/18 0900 02/23/18 1027   BP: 163/83 (!) 177/78 (!) 186/74 147/75   Pulse: (!) 48 (!) 48 (!) 54 69   Patient Position - Orthostatic VS: Lying Lying Lying        Physical Exam   Constitutional: He appears well-developed and well-nourished  No distress  HENT:   Right Ear: External ear normal    Left Ear: External ear normal    Mouth/Throat: Oropharynx is clear and moist    Superficial skin tear to forehead with small underlying hematoma, above previous abrasion  Previous abrasion appears to be healing well  Eyes: Conjunctivae and EOM are normal  Right eye exhibits no discharge  Left eye exhibits no discharge  Neck: Normal range of motion  Neck supple  Cardiovascular: Normal rate, regular rhythm and intact distal pulses  Pulmonary/Chest: Effort normal and breath sounds normal  No respiratory distress  Abdominal: Soft  There is no tenderness  There is no rebound and no guarding  Musculoskeletal: Normal range of motion  He exhibits no tenderness or deformity  Neurological: He is alert  No sensory deficit  He exhibits normal muscle tone  Skin: Skin is warm and dry  Nursing note and vitals reviewed  ED Medications  Medications - No data to display    Diagnostic Studies  Results Reviewed     None                 CT spine cervical without contrast   Final Result by Sahil Perez MD (02/23 9816)      No cervical spine fracture or traumatic malalignment  Workstation performed: [id number]         CT head without contrast   Final Result by Sahil Perez MD (02/23 0809)      Stable exam   No acute intracranial abnormality  Workstation performed: [id number]               Procedures  Procedures      Phone Consults  ED Phone Contact    ED Course  ED Course                                MDM  Number of Diagnoses or Management Options  Abrasion of forehead, initial encounter:   Fall from bed, initial encounter:   Diagnosis management comments: CT head and neck without acute process  Will place consult to case management to discuss with nursing facility about pt repeatedly rolling out of bed  Third ED visit for same in 2 days      CritCare Time    Disposition  Final diagnoses:   Fall from bed, initial encounter   Abrasion of forehead, initial encounter     Time reflects when diagnosis was documented in both MDM as applicable and the Disposition within this note     Time User Action Codes Description Comment    2/23/2018 10:39 AM Mario Colindres Add [W06  XXXA] Fall from bed, initial encounter     2/23/2018 10:39 AM Mario Colindres Add [S00 [id number]] Abrasion of forehead, initial encounter       ED Disposition     ED Disposition Condition Comment    Discharge  [facility]lyn[facility] Terrie  discharge to home/self care      Condition at discharge: Stable        Follow-up Information     Follow up With Specialties Details Why [address], MD Family Medicine Go in 3 days  [address] [facility]  [phone]          Discharge Medication List as of 2/23/2018 10:39 AM      CONTINUE these medications which have NOT CHANGED    Details   aspirin (ECOTRIN LOW STRENGTH) 81 mg EC tablet Take 81 mg by mouth daily, Historical Med      azithromycin (ZITHROMAX) 250 mg tablet Take 2 tablets today then 1 tablet daily x 4 days, Print      cefpodoxime (VANTIN) 200 mg tablet Take 200 mg by mouth 2 (two) times a day For 5 days, end 10/11, Historical Med      guaiFENesin (ROBITUSSIN) 100 mg/5 mL syrup Take 200 mg by mouth 4 (four) times a day as needed for cough X 5 days, end 10/9, Historical Med      hydrOXYzine HCL (ATARAX) 10 mg tablet Take 25 mg by mouth every 8 (eight) hours as needed for itching, Historical Med      levothyroxine 100 mcg tablet Take 137 mcg by mouth daily, Historical Med      LORazepam (ATIVAN) 0 5 mg tablet Take 0 25 mg by mouth every 4 (four) hours as needed for anxiety, Historical Med      memantine (NAMENDA) 10 mg tablet Take 5 mg by mouth 2 (two) times a day  , Historical Med      metoprolol succinate (TOPROL-XL) 50 mg 24 hr tablet Take 25 mg by mouth daily, Historical Med      permethrin (ELIMITE) 5 % cream Apply to affected area once, Print      QUEtiapine (SEROquel) 25 mg tablet Take 12 5 mg by mouth 2 (two) times a day, Historical Med      traZODone (DESYREL) 50 mg tablet Take 50 mg by mouth daily, Historical Med      triamcinolone (KENALOG) 0 1 % cream Apply topically daily at bedtime, Historical Med      white petrolatum-mineral oil (EUCERIN,HYDROCERIN) cream Apply topically daily, Historical Med      white petrolatum-corn starch-lanolin (TRIPLE PASTE) 12 8 % ointment Apply topically as needed for irritation, Historical Med           No discharge procedures on file  ED Provider  Attending physically available and evaluated Adelene Records    I managed the patient along with the ED Attending      Electronically Signed by         Leroy Mays MD  02/25/18 9409

## 2018-02-23 NOTE — ED NOTES
Daughter Fide Gutierrez [phone]  States that the nursing home keeps sending him in and he keeps falling  Daughter would like to talk to Case management  She is concerned he has been  Her 3 times in 24 hours       Valentín Hooks RN  02/23/18 8527

## 2018-02-23 NOTE — DISCHARGE INSTRUCTIONS
Fall Prevention   WHAT YOU NEED TO KNOW:   Fall prevention includes ways to make your home and other areas safer  It also includes ways you can move more carefully to prevent a fall  Health conditions that cause changes in your blood pressure, vision, or muscle strength and coordination may increase your risk for falls  Medicines may also increase your risk for falls if they make you dizzy, weak, or sleepy  DISCHARGE INSTRUCTIONS:   Call 911 or have someone else call if:   · You have fallen and are unconscious  · You have fallen and cannot move part of your body  Contact your healthcare provider if:   · You have fallen and have pain or a headache  · You have questions or concerns about your condition or care  Fall prevention tips:   · Stand or sit up slowly  This may help you keep your balance and prevent falls  · Use assistive devices as directed  Your healthcare provider may suggest that you use a cane or walker to help you keep your balance  You may need to have grab bars put in your bathroom near the toilet or in the shower  · Wear shoes that fit well and have soles that   Wear shoes both inside and outside  Use slippers with good   Do not wear shoes with high heels  · Wear a personal alarm  This is a device that allows you to call 911 if you fall and need help  Ask your healthcare provider for more information  · Stay active  Exercise can help strengthen your muscles and improve your balance  Your healthcare provider may recommend water aerobics or walking  He or she may also recommend physical therapy to improve your coordination  Never start an exercise program without talking to your healthcare provider first      · Manage your medical conditions  Keep all appointments with your healthcare providers  Visit your eye doctor as directed  Home safety tips:   · Add items to prevent falls in the bathroom    Put nonslip strips on your bath or shower floor to prevent you from slipping  Use a bath mat if you do not have carpet in the bathroom  This will prevent you from falling when you step out of the bath or shower  Use a shower seat so you do not need to stand while you shower  Sit on the toilet or a chair in your bathroom to dry yourself and put on clothing  This will prevent you from losing your balance from drying or dressing yourself while you are standing  · Keep paths clear  Remove books, shoes, and other objects from walkways and stairs  Place cords for telephones and lamps out of the way so that you do not need to walk over them  Tape them down if you cannot move them  Remove small rugs  If you cannot remove a rug, secure it with double-sided tape  This will prevent you from tripping  · Install bright lights in your home  Use night lights to help light paths to the bathroom or kitchen  Always turn on the light before you start walking  · Keep items you use often on shelves within reach  Do not use a step stool to help you reach an item  · Paint or place reflective tape on the edges of your stairs  This will help you see the stairs better  Follow up with your healthcare provider as directed:  Write down your questions so you remember to ask them during your visits  © 2017 [address] [employer] Information is for End User's use only and may not be sold, redistributed or otherwise used for commercial purposes  All illustrations and images included in CareNotes® are the copyrighted property of A D A M , [employer]  or [employer]  The above information is an  only  It is not intended as medical advice for individual conditions or treatments  Talk to your doctor, nurse or pharmacist before following any medical regimen to see if it is safe and effective for you

## 2018-06-28 PROBLEM — F02.80 ALZHEIMER DISEASE (HCC): Status: ACTIVE | Noted: 2017-08-07

## 2018-06-28 PROBLEM — I48.20 CHRONIC ATRIAL FIBRILLATION (HCC): Status: ACTIVE | Noted: 2017-08-07

## 2018-06-28 PROBLEM — I10 BENIGN ESSENTIAL HYPERTENSION: Status: ACTIVE | Noted: 2017-08-07

## 2018-06-28 PROBLEM — G30.9 ALZHEIMER DISEASE (HCC): Status: ACTIVE | Noted: 2017-08-07

## 2018-06-28 PROBLEM — R26.9 ABNORMAL GAIT: Status: ACTIVE | Noted: 2017-08-07

## 2018-07-02 DIAGNOSIS — J30.81 ALLERGIC RHINITIS DUE TO ANIMAL HAIR AND DANDER: Primary | ICD-10-CM

## 2018-07-02 DIAGNOSIS — E03.4 HYPOTHYROIDISM DUE TO ACQUIRED ATROPHY OF THYROID: ICD-10-CM

## 2018-07-02 DIAGNOSIS — I10 HYPERTENSION, UNSPECIFIED TYPE: Primary | ICD-10-CM

## 2018-07-03 RX ORDER — METOPROLOL SUCCINATE 50 MG/1
TABLET, EXTENDED RELEASE ORAL
Qty: 28 TABLET | Refills: 3 | Status: SHIPPED | OUTPATIENT
Start: 2018-07-03 | End: 2018-10-23 | Stop reason: SDUPTHER

## 2018-07-04 RX ORDER — FEXOFENADINE HCL 180 MG/1
TABLET ORAL
Qty: 30 TABLET | Refills: 11 | Status: SHIPPED | OUTPATIENT
Start: 2018-07-04 | End: 2018-08-07 | Stop reason: SDUPTHER

## 2018-07-04 RX ORDER — LEVOTHYROXINE SODIUM 137 UG/1
TABLET ORAL
Qty: 28 TABLET | Refills: 11 | Status: SHIPPED | OUTPATIENT
Start: 2018-07-04 | End: 2019-03-12 | Stop reason: SDUPTHER

## 2018-07-10 DIAGNOSIS — I25.10 CORONARY ARTERY DISEASE INVOLVING NATIVE CORONARY ARTERY OF NATIVE HEART WITHOUT ANGINA PECTORIS: Primary | ICD-10-CM

## 2018-07-10 DIAGNOSIS — F03.91 DEMENTIA WITH BEHAVIORAL DISTURBANCE, UNSPECIFIED DEMENTIA TYPE (HCC): ICD-10-CM

## 2018-07-11 RX ORDER — QUETIAPINE FUMARATE 25 MG/1
TABLET, FILM COATED ORAL
Qty: 28 TABLET | Refills: 5 | Status: SHIPPED | OUTPATIENT
Start: 2018-07-11 | End: 2018-12-03 | Stop reason: SDUPTHER

## 2018-07-11 RX ORDER — ASPIRIN 81 MG/1
TABLET, CHEWABLE ORAL
Qty: 28 TABLET | Refills: 11 | Status: SHIPPED | OUTPATIENT
Start: 2018-07-11

## 2018-07-17 DIAGNOSIS — T78.40XD ALLERGIC DISORDER, SUBSEQUENT ENCOUNTER: ICD-10-CM

## 2018-07-17 RX ORDER — FLUTICASONE PROPIONATE 50 MCG
SPRAY, SUSPENSION (ML) NASAL
Qty: 16 G | Refills: 5 | Status: SHIPPED | OUTPATIENT
Start: 2018-07-17

## 2018-07-23 DIAGNOSIS — R23.8 SKIN BREAKDOWN: Primary | ICD-10-CM

## 2018-07-24 RX ORDER — ZINC OXIDE 12.8 G/100G
PASTE TOPICAL
Qty: 454 G | Refills: 5 | Status: SHIPPED | OUTPATIENT
Start: 2018-07-24

## 2018-08-07 DIAGNOSIS — J30.81 ALLERGIC RHINITIS DUE TO ANIMAL HAIR AND DANDER: ICD-10-CM

## 2018-08-08 RX ORDER — FEXOFENADINE HCL 180 MG/1
TABLET ORAL
Qty: 28 TABLET | Refills: 11 | Status: SHIPPED | OUTPATIENT
Start: 2018-08-08

## 2018-08-16 RX ORDER — VALPROIC ACID 250 MG/5ML
SOLUTION ORAL
COMMUNITY
End: 2018-12-03 | Stop reason: SDUPTHER

## 2018-08-24 DIAGNOSIS — L85.3 XEROSIS CUTIS: Primary | ICD-10-CM

## 2018-08-27 RX ORDER — TRIAMCINOLONE ACETONIDE 1 MG/G
CREAM TOPICAL
Qty: 454 G | Refills: 2 | Status: SHIPPED | OUTPATIENT
Start: 2018-08-27 | End: 2018-09-18 | Stop reason: ALTCHOICE

## 2018-09-17 DIAGNOSIS — R60.0 LOCALIZED EDEMA: Primary | ICD-10-CM

## 2018-09-17 RX ORDER — FUROSEMIDE 40 MG/1
40 TABLET ORAL 2 TIMES DAILY
Qty: 10 TABLET | Refills: 0 | Status: SHIPPED | OUTPATIENT
Start: 2018-09-17 | End: 2018-12-06 | Stop reason: ALTCHOICE

## 2018-09-18 ENCOUNTER — IN HOME VISIT (OUTPATIENT)
Dept: GERIATRICS | Age: 83
End: 2018-09-18
Payer: MEDICARE

## 2018-09-18 ENCOUNTER — HOSPITAL ENCOUNTER (EMERGENCY)
Facility: HOSPITAL | Age: 83
Discharge: NURSING, REHAB OR LONG-TERM CARE | End: 2018-09-19
Attending: EMERGENCY MEDICINE
Payer: MEDICARE

## 2018-09-18 DIAGNOSIS — I82.409 DEEP VENOUS THROMBOSIS (HCC): Primary | ICD-10-CM

## 2018-09-18 DIAGNOSIS — R60.0 LOCALIZED EDEMA: Primary | ICD-10-CM

## 2018-09-18 DIAGNOSIS — L03.115 CELLULITIS OF RIGHT LOWER EXTREMITY: ICD-10-CM

## 2018-09-18 PROCEDURE — 99336 PR DOM/R-HOME E/M EST PT MOD HI SEVERITY 40 MINUTES: CPT | Performed by: NURSE PRACTITIONER

## 2018-09-18 RX ORDER — CEPHALEXIN 250 MG/1
500 CAPSULE ORAL ONCE
Status: COMPLETED | OUTPATIENT
Start: 2018-09-19 | End: 2018-09-19

## 2018-09-18 NOTE — PROGRESS NOTES
Assessment/Plan:    No problem-specific Assessment & Plan notes found for this encounter  Diagnoses and all orders for this visit:    Localized edema  Comments:  -furosemide 40mg BID x 5 days ordered 9/17/18  -RLE with more edema than LLE  Venous doppler of RLE ordered to rule out DVT          Subjective:      Patient ID: Lianne Matthews  is a 80 y o  male  80year old male being seen for reports of lower extremity edema  He was ordered yesterday 9/17/18 to receive lasix 40mg PO BID x 5 days for this  The following portions of the patient's history were reviewed and updated as appropriate: allergies, current medications and problem list     Review of Systems   Constitutional: Negative for chills and fever  HENT: Negative for congestion  Respiratory: Negative for cough and shortness of breath  Cardiovascular: Negative for chest pain and leg swelling  Gastrointestinal: Negative for constipation and diarrhea  Genitourinary: Negative for dysuria  Skin: Negative for color change  Objective: There were no vitals taken for this visit  Physical Exam   Constitutional: No distress  HENT:   Head: Normocephalic and atraumatic  Nose: Nose normal    Eyes: Right eye exhibits no discharge  Left eye exhibits no discharge  Neck: No JVD present  No thyromegaly present  Cardiovascular: Normal rate and regular rhythm  Pulmonary/Chest: Effort normal and breath sounds normal  No respiratory distress  He has no wheezes  He has no rales  He exhibits no tenderness  No cough  No SOB at rest   Musculoskeletal: He exhibits edema  He exhibits no tenderness  1+ pitting edema to LLE  1+-2+ pitting edema to RLE with some reported calf tenderness  No palpable cords   Lymphadenopathy:     He has no cervical adenopathy  Neurological: He is alert  Skin: Skin is warm and dry  He is not diaphoretic     Psychiatric: His behavior is normal

## 2018-09-19 VITALS
WEIGHT: 184.97 LBS | DIASTOLIC BLOOD PRESSURE: 82 MMHG | SYSTOLIC BLOOD PRESSURE: 162 MMHG | HEART RATE: 66 BPM | BODY MASS INDEX: 25.9 KG/M2 | RESPIRATION RATE: 18 BRPM | TEMPERATURE: 97.8 F | HEIGHT: 71 IN | OXYGEN SATURATION: 99 %

## 2018-09-19 DIAGNOSIS — L29.9 ITCH: Primary | ICD-10-CM

## 2018-09-19 PROCEDURE — 99283 EMERGENCY DEPT VISIT LOW MDM: CPT

## 2018-09-19 RX ORDER — HYDROXYZINE HYDROCHLORIDE 10 MG/1
TABLET, FILM COATED ORAL
Qty: 30 TABLET | Refills: 0 | Status: SHIPPED | OUTPATIENT
Start: 2018-09-19 | End: 2018-12-06 | Stop reason: ALTCHOICE

## 2018-09-19 RX ORDER — CEPHALEXIN 500 MG/1
500 CAPSULE ORAL 4 TIMES DAILY
Qty: 28 CAPSULE | Refills: 0 | Status: SHIPPED | OUTPATIENT
Start: 2018-09-19 | End: 2018-09-26

## 2018-09-19 RX ADMIN — CEPHALEXIN 500 MG: 250 CAPSULE ORAL at 00:37

## 2018-09-19 RX ADMIN — RIVAROXABAN 15 MG: 15 TABLET, FILM COATED ORAL at 00:37

## 2018-09-19 NOTE — ED PROVIDER NOTES
History  Chief Complaint   Patient presents with    Leg Pain     Pt had doppler study of right leg that was positive for blood clot  Reports no pain at this time  HPI   Patient is a 55-year-old gentleman who presents to the emergency department for possible DVT  Patient is a resident of [facility]  He was evaluated by a provider earlier today, who noted some right lower extremity swelling and edema  Patient had right DVU scan performed, that per EMS report was positive for a DVT  Patient does have dementia and is not able to give me any additional meaningful history  He tells me that his right leg did hurt earlier but is no longer bothering him  He denies any chest pain or shortness of breath  I attempted to contact [facility] for additional information; however, I was not able to reach anyone who knew anything about the patient  I am also unable to obtain a copy of the ultrasound report  Prior to Admission Medications   Prescriptions Last Dose Informant Patient Reported? Taking? CHILDRENS ASPIRIN LOW STRENGTH 81 MG chewable tablet   No No   Sig: TAKE 1 TABLET BY MOUTH ONCE DAILY   LORazepam (ATIVAN) 0 5 mg tablet   Yes No   Sig: Take 0 25 mg by mouth every 4 (four) hours as needed for anxiety   QUEtiapine (SEROquel) 25 mg tablet   No No   Sig: TAKE 1 TABLET BY MOUTH DAILY IN THE EVENING  TRIPLE PASTE 12 8 % ointment   No No   Sig: APPLY TOPICALLY TO BUTTOCKS TWICE DAILY UNTIL HEALED, THEN AS NEEDED   Valproic Acid (DEPAKENE) 250 MG/5ML soln   Yes No   Sig: Take 2 5ml by mouth twice daily   fexofenadine (ALLEGRA) 180 MG tablet   No No   Sig: TAKE 1 TABLET BY MOUTH ONCE DAILY     fluticasone (FLONASE) 50 mcg/act nasal spray   No No   Sig: USE 1 SPRAY IN EACH NOSTRIL ONCE DAILY   furosemide (LASIX) 40 mg tablet   No No   Sig: Take 1 tablet (40 mg total) by mouth 2 (two) times a day for 5 days   hydrOXYzine HCL (ATARAX) 10 mg tablet   Yes No   Sig: Take 25 mg by mouth every 8 (eight) hours as needed for itching   levothyroxine 137 mcg tablet   No No   Sig: TAKE 1 TABLET BY MOUTH ONCE DAILY   metoprolol succinate (TOPROL-XL) 50 mg 24 hr tablet   No No   Sig: TAKE 1 TABLET BY MOUTH ONCE DAILY  white petrolatum-mineral oil (EUCERIN,HYDROCERIN) cream   Yes No   Sig: Apply topically daily      Facility-Administered Medications: None       Past Medical History:   Diagnosis Date    Alzheimer disease     Atrial fibrillation (HCC)     Edema extremities     Hyperlipidemia     Hypertension        History reviewed  No pertinent surgical history  History reviewed  No pertinent family history  I have reviewed and agree with the history as documented  Social History   Substance Use Topics    Smoking status: Never Smoker    Smokeless tobacco: Never Used      Comment: UNKNOWN IF EVER SMOKED AS PER [facility]EXTGEN    Alcohol use No        Review of Systems   Unable to perform ROS: Dementia       Physical Exam  ED Triage Vitals [09/18/18 2221]   Temperature Pulse Respirations Blood Pressure SpO2   97 8 °F (36 6 °C) 64 18 (!) 186/85 97 %      Temp Source Heart Rate Source Patient Position - Orthostatic VS BP Location FiO2 (%)   Oral Monitor Lying Right arm --      Pain Score       No Pain           Orthostatic Vital Signs  Vitals:    09/18/18 2221   BP: (!) 186/85   Pulse: 64   Patient Position - Orthostatic VS: Lying       Physical Exam   Constitutional: He is oriented to person, place, and time  He appears well-developed and well-nourished  No distress  Patient is demented but oriented to person and place  Unable to tell me why he is in the emergency department  HENT:   Head: Normocephalic and atraumatic  Right Ear: External ear normal    Left Ear: External ear normal    Nose: Nose normal    Mouth/Throat: Oropharynx is clear and moist    Eyes: Conjunctivae and EOM are normal  Pupils are equal, round, and reactive to light  No scleral icterus  Neck: Normal range of motion  Neck supple   No JVD present  No tracheal deviation present  Cardiovascular: Normal rate and regular rhythm  Exam reveals no gallop and no friction rub  No murmur heard  Pulmonary/Chest: Effort normal and breath sounds normal  No stridor  No respiratory distress  He has no wheezes  He has no rales  He exhibits no tenderness  Abdominal: Soft  He exhibits no distension  There is no tenderness  There is no rebound and no guarding  Musculoskeletal: Normal range of motion  He exhibits edema and tenderness  2+ pitting edema of the left lower extremity up to the anterior shin  The right lower extremity appears swollen  Patient has mild calf tenderness to palpation  Lymphadenopathy:     He has no cervical adenopathy  Neurological: He is alert and oriented to person, place, and time  No cranial nerve deficit or sensory deficit  He exhibits normal muscle tone  Skin: Skin is warm and dry  He is not diaphoretic  There is erythema  No pallor  The right lower extremity is warm and erythematous  Psychiatric: He has a normal mood and affect  His behavior is normal    Nursing note and vitals reviewed        ED Medications  Medications   rivaroxaban (XARELTO) tablet 15 mg (15 mg Oral Given 9/19/18 0037)   cephalexin (KEFLEX) capsule 500 mg (500 mg Oral Given 9/19/18 0037)       Diagnostic Studies  Results Reviewed     None                 No orders to display         Procedures  Procedures      Phone Consults  ED Phone Contact    ED Course         MDM  Number of Diagnoses or Management Options  Cellulitis of right lower extremity: new and does not require workup  Deep venous thrombosis ([address] ): new and does not require workup     Amount and/or Complexity of Data Reviewed  Decide to obtain previous medical records or to obtain history from someone other than the patient: yes  Obtain history from someone other than the patient: yes    Patient Progress  Patient progress: stable     80year-old gentleman presenting to the emergency department for possible right lower extremity DVT  He has dementia and unfortunately I am unable to obtain much additional meaningful information from patient's nursing home  He is at his mental status baseline and is aebrile, hemodynamically stable  Given that his exam is clinically consistent with DVT and cellulitis and I am unable to obtain a copy of the patient's ultrasound report or repeat scan at this time, I will treat him with Xarelto and Keflex  He will receive his 1st dose of each in the emergency department  Patient will be discharged with a prescription for a remainder of course of Keflex for cellulitis and 3 days worth of Xarelto  I have highlighted on patient's discharge paperwork that he will need to have an appointment with his PCP scheduled for as soon as possible to discuss his continuing anticoagulation regimen and risks/benefits of continuing a direct oral anticoagulant  Patient cleared for transport back to [facility]  CritCare Time    Disposition  Final diagnoses:   Deep venous thrombosis ([facility] [address] )   Cellulitis of right lower extremity     Time reflects when diagnosis was documented in both MDM as applicable and the Disposition within this note     Time User Action Codes Description Comment    9/18/2018 11:59 PM Jalyn Felder [[id number]] Deep venous thrombosis ([facility] [address] )     9/19/2018 12:00 AM Jalyn Felder [[id number]] Cellulitis of right lower extremity       ED Disposition     ED Disposition Condition Comment    Discharge  Mohinder Ahumada  should be transferred out to [facility]  Follow-up Information     Follow up With Specialties Details Why Lawrence Hui MD Geriatric Medicine, Family Medicine Schedule an appointment as soon as possible for a visit Anticoagulation regimen [address]  [phone]            Patient's Medications   Discharge Prescriptions    CEPHALEXIN (KEFLEX) 500 MG CAPSULE    Take 1 capsule (500 mg total) by mouth 4 (four) times a day for 7 days       Start Date: 9/19/2018 End Date: 9/26/2018       Order Dose: 500 mg       Quantity: 28 capsule    Refills: 0    RIVAROXABAN (XARELTO) 15 MG TABLET    Take 1 tablet (15 mg total) by mouth 2 (two) times a day with meals       Start Date: 9/19/2018 End Date: --       Order Dose: 15 mg       Quantity: 6 tablet    Refills: 0     No discharge procedures on file  ED Provider  Attending physically available and evaluated Laxmi Khanna I managed the patient along with the ED Attending      Electronically Signed by         Dmitry Padilla MD  09/19/18 4106

## 2018-09-19 NOTE — DISCHARGE INSTRUCTIONS
PATIENT NEEDS TO FOLLOW UP WITH HIS PRIMARY CARE DOCTOR TO DISCUSS ANTICOAGULATION REGIMEN! PLEASE SCHEDULE AN APPOINTMENT FOR [facility] AS SOON AS POSSIBLE! Deep Venous Thrombosis   WHAT YOU NEED TO KNOW:   Deep venous thrombosis (DVT) is a blood clot that forms in a deep vein of the body  The deep veins in the legs, thighs, and hips are the most common sites for DVT  DVT can also occur in a deep vein within your arms  The clot prevents the normal flow of blood in the vein  The blood backs up and causes pain and swelling  The DVT can break into smaller pieces and travel to your lungs and cause a blockage called a pulmonary embolism  A pulmonary embolism can become life-threatening  DISCHARGE INSTRUCTIONS:   Call 911 for any of the following:   · You feel lightheaded, short of breath, and have chest pain  · You cough up blood  Return to the emergency department if:   · Your symptoms get worse  · You develop new DVT symptoms in another leg or arm  Contact your healthcare provider if:   · Your gums or nose bleed  · You see blood in your urine or bowel movements  · Your bowel movements are black or darker than normal     · You have questions or concerns about your conditions or care  Medicines:   · Blood thinners  help prevent the DVT from getting bigger and prevent new clots from forming  Examples of blood thinners include heparin, rivaroxaban, apixiban, and warfarin  The following are general safety guidelines to follow while you are taking a blood thinner:     ¨ Watch for bleeding and bruising  Watch for bleeding from your gums or nose  Watch for blood in your urine and bowel movements  Use a soft washcloth on your skin, and a soft toothbrush to brush your teeth  This can keep your skin and gums from bleeding  If you shave, use an electric shaver  Do not play contact sports  ¨ Tell your dentist and other healthcare providers that you take a blood thinner   Wear a bracelet or necklace that says you take this medicine  ¨ Do not start or stop any medicines unless your healthcare provider tells you to  Many medicines cannot be used with blood thinners  ¨ Tell your healthcare provider right away if you forget to take the blood thinner , or if you take too much  ¨ Warfarin  is a blood thinner that you may need to take  The following are additional things you should be aware of if you take warfarin:    § Foods and medicines can affect the amount of warfarin in your blood  Do not make major changes to your diet  Warfarin works best when you eat about the same amount of vitamin K every day  Vitamin K is found in green leafy vegetables and certain other foods  Ask for more information about what to eat or not to eat  § You will need to see your healthcare provider for follow-up visits  You will need regular blood tests to decide how much warfarin you need  · Take your medicine as directed  Contact your healthcare provider if you think your medicine is not helping or if you have side effects  Tell him or her if you are allergic to any medicine  Keep a list of the medicines, vitamins, and herbs you take  Include the amounts, and when and why you take them  Bring the list or the pill bottles to follow-up visits  Carry your medicine list with you in case of an emergency  Manage your DVT:   · Wear pressure stockings  The stockings are tight and put pressure on your legs  This improves blood flow and helps prevent clots  Wear the stockings during the day  Do not wear them when you sleep  · Elevate your legs  above the level of your heart as often as you can  This will help decrease swelling and pain  Prop your legs on pillows or blankets to keep them elevated comfortably  · Exercise regularly  to help increase your blood flow  Walking is a good low-impact exercise  Talk to your healthcare provider about the best exercise plan for you  · Change body positions often    If you travel by car or work at a desk, move and stretch in your seat several times each hour  In an airplane, get up and walk every hour  If you are bedridden, ask for help to change your position every 1 to 2 hours  · Maintain a healthy weight  Ask your healthcare provider how much you should weigh  Ask him to help you create a weight loss plan if you are overweight  · Do not smoke  Nicotine and other chemicals in cigarettes and cigars can damage blood vessels and make it more difficult to manage your DVT  Ask your healthcare provider for information if you currently smoke and need help to quit  E-cigarettes or smokeless tobacco still contain nicotine  Talk to your healthcare provider before you use these products  Follow up with your healthcare provider as directed:  Write down your questions so you remember to ask them during your visits  © 2017 [facility] Information is for End User's use only and may not be sold, redistributed or otherwise used for commercial purposes  All illustrations and images included in CareNotes® are the copyrighted property of A D A M [employer]  or [employer]  The above information is an  only  It is not intended as medical advice for individual conditions or treatments  Talk to your doctor, nurse or pharmacist before following any medical regimen to see if it is safe and effective for you  Cellulitis   WHAT YOU NEED TO KNOW:   Cellulitis is a skin infection caused by bacteria  Cellulitis may go away on its own or you may need treatment  Your healthcare provider may draw a [place] around the outside edges of your cellulitis  If your cellulitis spreads, your healthcare provider will see it outside of the [place]  DISCHARGE INSTRUCTIONS:   Call 911 if:   · You have sudden trouble breathing or chest pain  Return to the emergency department if:   · Your wound gets larger and more painful       · You feel a crackling under your skin when you touch it  · You have purple dots or bumps on your skin, or you see bleeding under your skin  · You have new swelling and pain in your legs  · The red, warm, swollen area gets larger  · You see red streaks coming from the infected area  Contact your healthcare provider if:   · You have a fever  · Your fever or pain does not go away or gets worse  · The area does not get smaller after 2 days of antibiotics  · Your skin is flaking or peeling off  · You have questions or concerns about your condition or care  Medicines:   · Antibiotics  help treat the bacterial infection  · NSAIDs , such as ibuprofen, help decrease swelling, pain, and fever  NSAIDs can cause stomach bleeding or kidney problems in certain people  If you take blood thinner medicine, always ask if NSAIDs are safe for you  Always read the medicine label and follow directions  Do not give these medicines to children under 10months of age without direction from your child's healthcare provider  · Acetaminophen  decreases pain and fever  It is available without a doctor's order  Ask how much to take and how often to take it  Follow directions  Read the labels of all other medicines you are using to see if they also contain acetaminophen, or ask your doctor or pharmacist  Acetaminophen can cause liver damage if not taken correctly  Do not use more than 4 grams (4,000 milligrams) total of acetaminophen in one day  · Take your medicine as directed  Contact your healthcare provider if you think your medicine is not helping or if you have side effects  Tell him or her if you are allergic to any medicine  Keep a list of the medicines, vitamins, and herbs you take  Include the amounts, and when and why you take them  Bring the list or the pill bottles to follow-up visits  Carry your medicine list with you in case of an emergency  Self-care:   · Elevate the area above the level of your heart  as often as you can   This will help decrease swelling and pain  Prop the area on pillows or blankets to keep it elevated comfortably  · Clean the area daily until the wound scabs over  Gently wash the area with soap and water  Pat dry  Use dressings as directed  · Place cool or warm, wet cloths on the area as directed  Use clean cloths and clean water  Leave it on the area until the cloth is room temperature  Pat the area dry with a clean, dry cloth  The cloths may help decrease pain  Prevent cellulitis:   · Do not scratch bug bites or areas of injury  You increase your risk for cellulitis by scratching these areas  · Do not share personal items, such as towels, clothing, and razors  · Clean exercise equipment  with germ-killing  before and after you use it  · Wash your hands often  Use soap and water  Wash your hands after you use the bathroom, change a child's diapers, or sneeze  Wash your hands before you prepare or eat food  Use lotion to prevent dry, cracked skin  · Wear pressure stockings as directed  You may be told to wear the stockings if you have peripheral edema  The stockings improve blood flow and decrease swelling  · Treat athlete's foot  This can help prevent the spread of a bacterial skin infection  Follow up with your healthcare provider within 3 days, or as directed: Your healthcare provider will check if your cellulitis is getting better  You may need different medicine  Write down your questions so you remember to ask them during your visits  © 2017 [address] Information is for End User's use only and may not be sold, redistributed or otherwise used for commercial purposes  All illustrations and images included in CareNotes® are the copyrighted property of [employer]  or [employer]  The above information is an  only  It is not intended as medical advice for individual conditions or treatments   Talk to your doctor, nurse or pharmacist before following any medical regimen to see if it is safe and effective for you

## 2018-09-19 NOTE — ED ATTENDING ATTESTATION
Jeff Espino DO, saw and evaluated the patient  I have discussed the patient with the resident/non-physician practitioner and agree with the resident's/non-physician practitioner's findings, Plan of Care, and MDM as documented in the resident's/non-physician practitioner's note, except where noted  All available labs and Radiology studies were reviewed  At this point I agree with the current assessment done in the Emergency Department  I have conducted an independent evaluation of this patient a history and physical is as follows:    80 yom with dementia presents from [facility] because he had a positive RLE DVT study earlier today  Past Medical History:   Diagnosis Date    Alzheimer disease     Atrial fibrillation ([address] )     Edema extremities     Hyperlipidemia     Hypertension      History reviewed  No pertinent surgical history  BP (!) 186/85 (BP Location: Right arm)   Pulse 64   Temp 97 8 °F (36 6 °C) (Oral)   Resp 18   Ht 5' 11" (1 803 m)   Wt 83 9 kg (184 lb 15 5 oz)   SpO2 97%   BMI 25 80 kg/m²   Patient is appropriately somnolent given that it is midnight and despite dementia, is aware that he is at the hospital   He is oriented x2  No respiratory distress  No abdominal tenderness  Right lower extremity with erythema and 1+ pitting edema  Will give patient 1st dose Keflex and Xarelto for apparent cellulitis with reported DVT  He will be given a full script for antibiotics and a 3 day course Xarelto  Patient to follow up with his family doctor for complete course of his Xarelto and re-evaluation of rash      Diagnosis  Cellulitis  DVT          Critical Care Time  CritCare Time    Procedures

## 2018-09-20 ENCOUNTER — IN HOME VISIT (OUTPATIENT)
Dept: GERIATRICS | Age: 83
End: 2018-09-20
Payer: MEDICARE

## 2018-09-20 DIAGNOSIS — I82.411 DVT OF DEEP FEMORAL VEIN, RIGHT (HCC): Primary | ICD-10-CM

## 2018-09-20 DIAGNOSIS — L03.115 CELLULITIS OF RIGHT LEG: ICD-10-CM

## 2018-09-20 PROBLEM — B86 SCABIES: Status: ACTIVE | Noted: 2018-09-20

## 2018-09-20 PROCEDURE — 99335 PR DOM/R-HOME E/M EST PT LW MOD SEVERITY 25 MINUTES: CPT | Performed by: FAMILY MEDICINE

## 2018-09-20 NOTE — PROGRESS NOTES
Assessment/Plan:    No problem-specific Assessment & Plan notes found for this encounter  Diagnoses and all orders for this visit:    DVT of deep femoral vein, right (HCC)  Comments:  on Xarlto 15 mgs BIB fir 21 days then reduced  Miguelito check BMP to assess renal function to assure corect dose  Will have result in AM    Cellulitis of right leg  Comments:  no phusical signs of of infection, will finish the course of keflex          Subjective:      Patient ID: Mandi Zuniga  is a 80 y o  male  Patient was seen by a Lawrence Lange for swelling of his right leg and redness of the leg  A venous duplex Doppler was ordered which was positive for a clot in the femoral vein  He was sent to his ER on 9/18/18 and was started on Xarelto 15 mg twice a day and seen  for follow-up  The patient denies pains however is severely demented and is not spontaneously answering questions  He was alos treated for cellulitis of right leg with keflex        The following portions of the patient's history were reviewed and updated as appropriate: allergies, current medications, past medical history and problem list     Review of Systems   Unable to perform ROS: Dementia         Objective: There were no vitals taken for this visit  Physical Exam   Constitutional: He is oriented to person, place, and time  He appears cachectic  He is cooperative  HENT:   Head: Normocephalic  Eyes: Pupils are equal, round, and reactive to light  Scleral icterus is present  Neck: No JVD present  No tracheal deviation present  No thyromegaly present  Cardiovascular: Normal rate and regular rhythm  Exam reveals no gallop  No murmur heard  Right leg has 2-3 + edema of right leg to upper thigh  No calf tenderness, or cords palpable  The leg is only slightly red and not warm to touch  Left leg has 1+ edema   Pulmonary/Chest: Effort normal and breath sounds normal  No respiratory distress  He has no wheezes  He has no rales   He exhibits no tenderness  Abdominal: Soft  Bowel sounds are normal  He exhibits no distension and no mass  There is no tenderness  There is no rebound and no guarding  Musculoskeletal: He exhibits no tenderness or deformity  Lymphadenopathy:     He has no cervical adenopathy  Neurological: He is alert and oriented to person, place, and time  Skin: No rash noted

## 2018-10-04 ENCOUNTER — IN HOME VISIT (OUTPATIENT)
Dept: GERIATRICS | Age: 83
End: 2018-10-04
Payer: MEDICARE

## 2018-10-04 DIAGNOSIS — L24.0 IRRITANT CONTACT DERMATITIS DUE TO DETERGENT: Primary | ICD-10-CM

## 2018-10-04 DIAGNOSIS — Z23 ENCOUNTER FOR IMMUNIZATION: ICD-10-CM

## 2018-10-04 PROCEDURE — 99334 PR DOM/R-HOME E/M EST PT SELF-LMTD/MINOR 15 MINUTES: CPT | Performed by: NURSE PRACTITIONER

## 2018-10-04 PROCEDURE — 90662 IIV NO PRSV INCREASED AG IM: CPT | Performed by: NURSE PRACTITIONER

## 2018-10-04 PROCEDURE — G0008 ADMIN INFLUENZA VIRUS VAC: HCPCS | Performed by: NURSE PRACTITIONER

## 2018-10-04 NOTE — PROGRESS NOTES
Assessment/Plan:    No problem-specific Assessment & Plan notes found for this encounter  Diagnoses and all orders for this visit:    Irritant contact dermatitis due to detergent  Comments:  restart triamcinolone 0 1% cream apply BID to left anterior chest nd right flank    Encounter for immunization  -     influenza vaccine, [id number], high-dose, PF 0 5 mL, for patients 65 yr+ (FLUZONE HIGH-DOSE)          Subjective:      Patient ID: Angelo Jang  is a 80 y o  male  According to staff, one the triamcinolone cream was stopped, the rash reappeared on left anterior chest and right flank, pruritic but no blisters  The following portions of the patient's history were reviewed and updated as appropriate: allergies, current medications, past medical history and problem list     Review of Systems   Unable to perform ROS: Dementia         Objective: There were no vitals taken for this visit  Physical Exam   Skin: Skin is warm and dry  Rash noted  There is erythema  Psychiatric: Thought content normal  His affect is blunt  He is slowed and withdrawn  Cognition and memory are impaired  He expresses impulsivity and inappropriate judgment  He exhibits abnormal recent memory and abnormal remote memory  He is inattentive

## 2018-10-23 DIAGNOSIS — I10 HYPERTENSION, UNSPECIFIED TYPE: ICD-10-CM

## 2018-10-25 RX ORDER — METOPROLOL SUCCINATE 50 MG/1
TABLET, EXTENDED RELEASE ORAL
Qty: 30 TABLET | Refills: 3 | Status: SHIPPED | OUTPATIENT
Start: 2018-10-25 | End: 2019-02-19 | Stop reason: SDUPTHER

## 2018-11-01 ENCOUNTER — IN HOME VISIT (OUTPATIENT)
Dept: GERIATRICS | Age: 83
End: 2018-11-01
Payer: MEDICARE

## 2018-11-01 VITALS
TEMPERATURE: 97.9 F | SYSTOLIC BLOOD PRESSURE: 140 MMHG | DIASTOLIC BLOOD PRESSURE: 80 MMHG | HEART RATE: 79 BPM | RESPIRATION RATE: 16 BRPM

## 2018-11-01 DIAGNOSIS — I82.411 ACUTE DEEP VEIN THROMBOSIS (DVT) OF FEMORAL VEIN OF RIGHT LOWER EXTREMITY (HCC): ICD-10-CM

## 2018-11-01 DIAGNOSIS — G30.1 LATE ONSET ALZHEIMER'S DISEASE WITH BEHAVIORAL DISTURBANCE (HCC): ICD-10-CM

## 2018-11-01 DIAGNOSIS — J18.9 PNEUMONIA OF BOTH LOWER LOBES DUE TO INFECTIOUS ORGANISM: ICD-10-CM

## 2018-11-01 DIAGNOSIS — F02.81 LATE ONSET ALZHEIMER'S DISEASE WITH BEHAVIORAL DISTURBANCE (HCC): ICD-10-CM

## 2018-11-01 DIAGNOSIS — R41.0 DELIRIUM: Primary | ICD-10-CM

## 2018-11-01 PROCEDURE — 99336 PR DOM/R-HOME E/M EST PT MOD HI SEVERITY 40 MINUTES: CPT | Performed by: FAMILY MEDICINE

## 2018-11-01 NOTE — PROGRESS NOTES
Assessment/Plan:    No problem-specific Assessment & Plan notes found for this encounter  Diagnoses and all orders for this visit:    Delirium  Comments:  Probable pneumonia will start Bactrim DS b i d  times 5 days    Pneumonia of both lower lobes due to infectious organism [facility])  Comments:  Bactrim DS BID for 5 days, chest x-ray, CBC with diff, BMP    Late onset Alzheimer's disease with behavioral disturbance    Acute deep vein thrombosis (DVT) of femoral vein of right lower extremity ([address] )  Comments:  Apparently Xarelto has fallen of the med list will start 15 mgs daily for 4 month  Orders:  -     Discontinue: rivaroxaban (XARELTO) 15 mg tablet; Take 1 tablet (15 mg total) by mouth daily with breakfast  -     rivaroxaban (XARELTO) 15 mg tablet; Take 1 tablet (15 mg total) by mouth daily with breakfast          Subjective:      Patient ID: Autumn Johns  is a 80 y o  male  Asked to see patient because of moist wet cough but more importantly a decrease in mental status  He did not eat supper last night and had very little for breakfast this morning he is more sedated per the staff  There are several other residents in the secured unit which have moist cough but no change in appetite  The staff states he does not have a fever and has not showed any chills but does have rhinorrhea  The following portions of the patient's history were reviewed and updated as appropriate: allergies, current medications, past family history, past medical history, past social history, past surgical history and problem list     Review of Systems   Unable to perform ROS: Dementia         Objective:      /80   Pulse 79   Temp 97 9 °F (36 6 °C)   Resp 16          Physical Exam   Constitutional: He is oriented to person, place, and time  He appears cachectic  He is cooperative  HENT:   Head: Normocephalic  Eyes: Pupils are equal, round, and reactive to light  Neck: No JVD present   No tracheal deviation present  No thyromegaly present  Cardiovascular: Normal rate and regular rhythm  Exam reveals no gallop  No murmur heard  Edema +1 b/l, JVD negative   Pulmonary/Chest: Effort normal  No respiratory distress  He has no wheezes  He has rales (RLL and LLL)  He exhibits no tenderness  Abdominal: Soft  Bowel sounds are normal  He exhibits no distension and no mass  There is no tenderness  There is no rebound and no guarding  Musculoskeletal: He exhibits no tenderness or deformity  Lymphadenopathy:     He has no cervical adenopathy  Neurological: He is alert and oriented to person, place, and time  Skin: No rash noted

## 2018-11-12 DIAGNOSIS — L30.9 DERMATITIS: Primary | ICD-10-CM

## 2018-11-16 RX ORDER — TRIAMCINOLONE ACETONIDE 1 MG/G
CREAM TOPICAL
Qty: 454 G | Refills: 0 | Status: SHIPPED | OUTPATIENT
Start: 2018-11-16 | End: 2018-11-20 | Stop reason: SDUPTHER

## 2018-11-20 DIAGNOSIS — L30.9 DERMATITIS: ICD-10-CM

## 2018-11-27 RX ORDER — TRIAMCINOLONE ACETONIDE 1 MG/G
CREAM TOPICAL
Qty: 454 G | Refills: 5 | Status: SHIPPED | OUTPATIENT
Start: 2018-11-27 | End: 2018-12-06 | Stop reason: ALTCHOICE

## 2018-12-03 ENCOUNTER — TELEPHONE (OUTPATIENT)
Dept: GERIATRICS | Age: 83
End: 2018-12-03

## 2018-12-03 DIAGNOSIS — F03.91 DEMENTIA WITH BEHAVIORAL DISTURBANCE, UNSPECIFIED DEMENTIA TYPE (HCC): ICD-10-CM

## 2018-12-03 RX ORDER — QUETIAPINE FUMARATE 25 MG/1
12.5 TABLET, FILM COATED ORAL EVERY EVENING
Qty: 14 TABLET | Refills: 5 | Status: SHIPPED | OUTPATIENT
Start: 2018-12-03

## 2018-12-03 RX ORDER — VALPROIC ACID 250 MG/5ML
125 SOLUTION ORAL
Qty: 200 ML | Refills: 2 | Status: SHIPPED | OUTPATIENT
Start: 2018-12-03 | End: 2018-12-06 | Stop reason: ALTCHOICE

## 2018-12-06 ENCOUNTER — IN HOME VISIT (OUTPATIENT)
Dept: GERIATRICS | Age: 83
End: 2018-12-06
Payer: MEDICARE

## 2018-12-06 DIAGNOSIS — I48.20 CHRONIC ATRIAL FIBRILLATION (HCC): ICD-10-CM

## 2018-12-06 DIAGNOSIS — E03.4 HYPOTHYROIDISM DUE TO ACQUIRED ATROPHY OF THYROID: ICD-10-CM

## 2018-12-06 DIAGNOSIS — F02.81 LATE ONSET ALZHEIMER'S DISEASE WITH BEHAVIORAL DISTURBANCE (HCC): Primary | ICD-10-CM

## 2018-12-06 DIAGNOSIS — G30.1 LATE ONSET ALZHEIMER'S DISEASE WITH BEHAVIORAL DISTURBANCE (HCC): Primary | ICD-10-CM

## 2018-12-06 DIAGNOSIS — I10 BENIGN ESSENTIAL HYPERTENSION: ICD-10-CM

## 2018-12-06 PROCEDURE — 99336 PR DOM/R-HOME E/M EST PT MOD HI SEVERITY 40 MINUTES: CPT | Performed by: NURSE PRACTITIONER

## 2018-12-06 RX ORDER — ACETAMINOPHEN 500 MG
500 TABLET ORAL EVERY 4 HOURS PRN
COMMUNITY

## 2018-12-06 RX ORDER — GUAIFENESIN/DEXTROMETHORPHAN 100-10MG/5
5 SYRUP ORAL EVERY 6 HOURS PRN
COMMUNITY

## 2018-12-06 NOTE — PROGRESS NOTES
Assessment/Plan:    No problem-specific Assessment & Plan notes found for this encounter  Diagnoses and all orders for this visit:    Late onset Alzheimer's disease with behavioral disturbance  Comments:  -now on hospice  -check albumin at hospice request  -resides on secured unit  -on seroquel at HS    Benign essential hypertension  Comments:  -controlled with metoprolol 50mg QD     Hypothyroidism due to acquired atrophy of thyroid  Comments:  -levothyroxine 137mcg QD    Chronic atrial fibrillation (HCC)  Comments:  -Xarelto 15mg QD  -metoprolol 50mg QD  -DME paperwork completed    Other orders  -     Hypromellose (ARTIFICIAL TEARS OP); Apply 1 drop to eye every 4 (four) hours as needed  -     acetaminophen (TYLENOL) 500 mg tablet; Take 500 mg by mouth every 4 (four) hours as needed  -     dextromethorphan-guaiFENesin (ROBITUSSIN DM)  mg/5 mL syrup; Take 5 mL by mouth every 6 (six) hours as needed          Subjective:      Patient ID: Amparo Khoury  is a 80 y o  male  80year old male being seen for hospice follow up  He was started on hospice and facility [facility] is requesting visit and completion of DME paperwork  Staff and patient offer no concerns  Staff report he continues to sleep a lot during the day  Hospice requesting albumin level to be drawn  The following portions of the patient's history were reviewed and updated as appropriate: allergies, current medications and problem list     Review of Systems   Constitutional: Negative for chills and fever  HENT: Negative for congestion and sore throat  Eyes: Negative for pain  Respiratory: Negative for cough and shortness of breath  Cardiovascular: Negative for chest pain  Gastrointestinal: Negative for abdominal pain, constipation, diarrhea, nausea and vomiting  Genitourinary: Negative for dysuria  Musculoskeletal: Negative for arthralgias, back pain and myalgias  Skin: Negative for color change  Neurological: Negative for dizziness and headaches  Psychiatric/Behavioral: Negative for behavioral problems  All other systems reviewed and are negative  Objective: There were no vitals taken for this visit  Physical Exam   Constitutional: No distress  Sleeping in wheelchair upon my arrival but arouses easily and remains awake through duration of visit  HENT:   Head: Normocephalic and atraumatic  Nose: Nose normal    Eyes: Conjunctivae are normal  Right eye exhibits no discharge  Left eye exhibits no discharge  Neck: No JVD present  No tracheal deviation present  No thyromegaly present  Cardiovascular: Normal rate  An irregularly irregular rhythm present  No murmur heard  Pulmonary/Chest: Effort normal and breath sounds normal  No respiratory distress  He has no wheezes  He has no rales  He exhibits no tenderness  No cough  No SOB at rest   Abdominal: Soft  He exhibits no distension  There is no tenderness  There is no guarding  Musculoskeletal: He exhibits edema  He exhibits no tenderness  Trace pitting edema to BLEs  No calf tenderness  Moves all extremities   Lymphadenopathy:     He has no cervical adenopathy  Neurological: He is alert  Confused, cooperative with exam   Skin: Skin is warm and dry  He is not diaphoretic     Psychiatric: His behavior is normal

## 2019-02-19 DIAGNOSIS — I10 HYPERTENSION, UNSPECIFIED TYPE: ICD-10-CM

## 2019-02-19 DIAGNOSIS — I82.411 ACUTE DEEP VEIN THROMBOSIS (DVT) OF FEMORAL VEIN OF RIGHT LOWER EXTREMITY (HCC): ICD-10-CM

## 2019-02-21 RX ORDER — RIVAROXABAN 15 MG/1
TABLET, FILM COATED ORAL
Qty: 28 TABLET | Refills: 11 | Status: SHIPPED | OUTPATIENT
Start: 2019-02-21

## 2019-02-21 RX ORDER — METOPROLOL SUCCINATE 50 MG/1
TABLET, EXTENDED RELEASE ORAL
Qty: 28 TABLET | Refills: 11 | Status: SHIPPED | OUTPATIENT
Start: 2019-02-21

## 2019-03-01 DIAGNOSIS — K59.01 SLOW TRANSIT CONSTIPATION: Primary | ICD-10-CM

## 2019-03-04 RX ORDER — POLYETHYLENE GLYCOL 3350 17 G/17G
POWDER, FOR SOLUTION ORAL
Qty: 527 G | Refills: 11 | Status: SHIPPED | OUTPATIENT
Start: 2019-03-04

## 2019-03-12 DIAGNOSIS — E03.4 HYPOTHYROIDISM DUE TO ACQUIRED ATROPHY OF THYROID: ICD-10-CM

## 2019-03-13 RX ORDER — LEVOTHYROXINE SODIUM 137 UG/1
TABLET ORAL
Qty: 28 TABLET | Refills: 10 | Status: SHIPPED | OUTPATIENT
Start: 2019-03-13